# Patient Record
Sex: MALE | Race: WHITE
[De-identification: names, ages, dates, MRNs, and addresses within clinical notes are randomized per-mention and may not be internally consistent; named-entity substitution may affect disease eponyms.]

---

## 2017-03-03 ENCOUNTER — HOSPITAL ENCOUNTER (OUTPATIENT)
Dept: HOSPITAL 62 - LAB | Age: 72
End: 2017-03-03
Attending: RADIOLOGY
Payer: MEDICARE

## 2017-03-03 DIAGNOSIS — R97.20: ICD-10-CM

## 2017-03-03 DIAGNOSIS — C61: Primary | ICD-10-CM

## 2017-03-03 PROCEDURE — 84153 ASSAY OF PSA TOTAL: CPT

## 2017-03-03 PROCEDURE — 36415 COLL VENOUS BLD VENIPUNCTURE: CPT

## 2017-05-23 ENCOUNTER — HOSPITAL ENCOUNTER (OUTPATIENT)
Dept: HOSPITAL 62 - LAB | Age: 72
End: 2017-05-23
Attending: RADIOLOGY
Payer: MEDICARE

## 2017-05-23 DIAGNOSIS — C61: Primary | ICD-10-CM

## 2017-05-23 DIAGNOSIS — R97.20: ICD-10-CM

## 2017-05-23 PROCEDURE — 84153 ASSAY OF PSA TOTAL: CPT

## 2017-05-23 PROCEDURE — 36415 COLL VENOUS BLD VENIPUNCTURE: CPT

## 2017-08-21 ENCOUNTER — HOSPITAL ENCOUNTER (OUTPATIENT)
Dept: HOSPITAL 62 - LAB | Age: 72
End: 2017-08-21
Attending: RADIOLOGY
Payer: MEDICARE

## 2017-08-21 DIAGNOSIS — C61: Primary | ICD-10-CM

## 2017-08-21 DIAGNOSIS — R97.20: ICD-10-CM

## 2017-08-21 PROCEDURE — 36415 COLL VENOUS BLD VENIPUNCTURE: CPT

## 2017-08-21 PROCEDURE — 84153 ASSAY OF PSA TOTAL: CPT

## 2017-11-29 ENCOUNTER — HOSPITAL ENCOUNTER (OUTPATIENT)
Dept: HOSPITAL 62 - LAB | Age: 72
End: 2017-11-29
Attending: RADIOLOGY
Payer: MEDICARE

## 2017-11-29 DIAGNOSIS — C61: Primary | ICD-10-CM

## 2017-11-29 DIAGNOSIS — R97.20: ICD-10-CM

## 2017-11-29 PROCEDURE — 36415 COLL VENOUS BLD VENIPUNCTURE: CPT

## 2017-11-29 PROCEDURE — 84153 ASSAY OF PSA TOTAL: CPT

## 2018-02-26 ENCOUNTER — HOSPITAL ENCOUNTER (OUTPATIENT)
Dept: HOSPITAL 62 - LAB | Age: 73
End: 2018-02-26
Attending: RADIOLOGY
Payer: MEDICARE

## 2018-02-26 DIAGNOSIS — C61: Primary | ICD-10-CM

## 2018-02-26 PROCEDURE — 36415 COLL VENOUS BLD VENIPUNCTURE: CPT

## 2018-02-26 PROCEDURE — 84153 ASSAY OF PSA TOTAL: CPT

## 2018-03-22 ENCOUNTER — HOSPITAL ENCOUNTER (OUTPATIENT)
Dept: HOSPITAL 62 - WI | Age: 73
End: 2018-03-22
Attending: RADIOLOGY
Payer: MEDICARE

## 2018-03-22 DIAGNOSIS — N62: ICD-10-CM

## 2018-03-22 DIAGNOSIS — N64.4: Primary | ICD-10-CM

## 2018-03-22 DIAGNOSIS — C61: ICD-10-CM

## 2018-03-22 PROCEDURE — 77062 BREAST TOMOSYNTHESIS BI: CPT

## 2018-03-22 PROCEDURE — 76642 ULTRASOUND BREAST LIMITED: CPT

## 2018-03-22 PROCEDURE — 77066 DX MAMMO INCL CAD BI: CPT

## 2018-03-22 PROCEDURE — G0279 TOMOSYNTHESIS, MAMMO: HCPCS

## 2018-03-23 NOTE — WOMENS IMAGING REPORT
EXAM DESCRIPTION:  3D DX MAMMO BILAT; U/S BREAST UNILAT LIMITED



COMPLETED DATE/TIME:  3/22/2018 9:27 am; 3/22/2018 10:06 am



REASON FOR STUDY:  MASTODYNIA; LEFT BREAST; N64.4 C61  MALIGNANT NEOPLASM OF PROSTATE N64.4  MASTODYN
IA



COMPARISON:  None.



TECHNIQUE:  Standard craniocaudal and mediolateral oblique views of each breast recorded using digita
l acquisition and breast tomosynthesis.

Additional left male breast 90 mediolateral view, exaggerated craniocaudad view, and cone compressio
n retroareolar region in the CC and MLO orientations.

Left breast ultrasound was performed with comparison right male breast imaging with ultrasound



LIMITATIONS:  None.



FINDINGS:  RIGHT BREAST

MASSES: No suspicious masses.

CALCIFICATIONS: No new or suspicious calcifications.

ARCHITECTURAL DISTORTION: None.

DEVELOPING DENSITY: None.

ASYMMETRY: None noted.

OTHER: Mild gynecomastia

LEFT BREAST

MASSES: No suspicious masses.

CALCIFICATIONS: No new or suspicious calcifications.

ARCHITECTURAL DISTORTION: None.

DEVELOPING DENSITY: None.

ASYMMETRY: None noted.

OTHER: Mild gynecomastia

Read with the assistance of CAD:

.[facility] - R2 Cenova Version 1.3

.[facility] - R2 Cenova Version 1.3

.[facility] - R2 Cenova Version 2.4

.[facility] - R2 Cenova Version 2.4

.[facility] - R2  Version 9.2

Left breast ultrasound:  Ultrasound of the left male breast was performed.  There is mild left retroa
reolar gynecomastia.  Comparison ultrasound of the right male breast demonstrates mild right gynecoma
stia.



IMPRESSION:  Bilateral gynecomastia on mammography/tomosynthesis and ultrasound.  No worrisome featur
es



BREAST DENSITY:  a. The breasts are almost entirely fatty.



BIRAD:  2 Benign findings.



RECOMMENDATION:  RECOMMENDED FOLLOW UP: Clinical followup for bilateral benign-appearing gynecomastia


SPECIFIC INTERVENTION/IMAGING/CONSULTATION RECOMMENDED:No additional intervention/ imaging/consultati
on needed at this time.

COMMUNICATION:Patient notified by letter



COMMENT:  The patient has been notified of the results by letter per MQSA requirements. Additional no
tification policies are in place for contacting patient with suspicious or incomplete findings.

Quality ID #225: The American College of Radiology recommends an annual screening mammogram for women
 aged 40 years or over. This facility utilizes a reminder system to ensure that all patients receive 
reminder letters, and/or direct phone calls for appointments. This includes reminders for routine scr
eening mammograms, diagnostic mammograms, or other Breast Imaging Interventions when appropriate.  Th
is patient will be placed in the appropriate reminder system.

The American College of Radiology (ACR) has developed recommendations for screening MRI of the breast
s in certain patient populations, to be used in conjunction with mammography.  Breast MRI surveillanc
e may be appropriate for women with more than 20% lifetime risk of developing breast cancer  as deter
mined by genetic testing, significant family history of the disease, or history of mantle radiation f
or Hodgkins Disease.  ACR Practice Guidelines 2008.

[employer]



PQRS 6045F:  Fluoroscopic imaging is not utilized for breast tomosynthesis.



TECHNICAL DOCUMENTATION:  FINDING NUMBER: (1)

ASSESSMENT: (1)

JOB ID:  [id number]

 2011 [employer]- All Rights Reserved



Reading location - IP/workstation name: [facility]

## 2018-03-23 NOTE — WOMENS IMAGING REPORT
EXAM DESCRIPTION:  3D DX MAMMO BILAT; U/S BREAST UNILAT LIMITED



COMPLETED DATE/TIME:  3/22/2018 9:27 am; 3/22/2018 10:06 am



REASON FOR STUDY:  MASTODYNIA; LEFT BREAST; N64.4 C61  MALIGNANT NEOPLASM OF PROSTATE N64.4  MASTODYN
IA



COMPARISON:  None.



TECHNIQUE:  Standard craniocaudal and mediolateral oblique views of each breast recorded using digita
l acquisition and breast tomosynthesis.

Additional left male breast 90 mediolateral view, exaggerated craniocaudad view, and cone compressio
n retroareolar region in the CC and MLO orientations.

Left breast ultrasound was performed with comparison right male breast imaging with ultrasound



LIMITATIONS:  None.



FINDINGS:  RIGHT BREAST

MASSES: No suspicious masses.

CALCIFICATIONS: No new or suspicious calcifications.

ARCHITECTURAL DISTORTION: None.

DEVELOPING DENSITY: None.

ASYMMETRY: None noted.

OTHER: Mild gynecomastia

LEFT BREAST

MASSES: No suspicious masses.

CALCIFICATIONS: No new or suspicious calcifications.

ARCHITECTURAL DISTORTION: None.

DEVELOPING DENSITY: None.

ASYMMETRY: None noted.

OTHER: Mild gynecomastia

Read with the assistance of CAD:

.[facility] - R2 Cenova Version 1.3

.[facility] - R2 Cenova Version 1.3

.[facility] - R2 Cenova Version 2.4

.[facility] - R2 Cenova Version 2.4

.[facility] - R2  Version 9.2

Left breast ultrasound:  Ultrasound of the left male breast was performed.  There is mild left retroa
reolar gynecomastia.  Comparison ultrasound of the right male breast demonstrates mild right gynecoma
stia.



IMPRESSION:  Bilateral gynecomastia on mammography/tomosynthesis and ultrasound.  No worrisome featur
es



BREAST DENSITY:  a. The breasts are almost entirely fatty.



BIRAD:  2 Benign findings.



RECOMMENDATION:  RECOMMENDED FOLLOW UP: Clinical followup for bilateral benign-appearing gynecomastia


SPECIFIC INTERVENTION/IMAGING/CONSULTATION RECOMMENDED:No additional intervention/ imaging/consultati
on needed at this time.

COMMUNICATION:Patient notified by letter



COMMENT:  The patient has been notified of the results by letter per MQSA requirements. Additional no
tification policies are in place for contacting patient with suspicious or incomplete findings.

Quality ID #225: The American College of Radiology recommends an annual screening mammogram for women
 aged 40 years or over. This facility utilizes a reminder system to ensure that all patients receive 
reminder letters, and/or direct phone calls for appointments. This includes reminders for routine scr
eening mammograms, diagnostic mammograms, or other Breast Imaging Interventions when appropriate.  Th
is patient will be placed in the appropriate reminder system.

The American College of Radiology (ACR) has developed recommendations for screening MRI of the breast
s in certain patient populations, to be used in conjunction with mammography.  Breast MRI surveillanc
e may be appropriate for women with more than 20% lifetime risk of developing breast cancer  as deter
mined by genetic testing, significant family history of the disease, or history of mantle radiation f
or Hodgkins Disease.  ACR Practice Guidelines 2008.

[employer]



PQRS 6045F:  Fluoroscopic imaging is not utilized for breast tomosynthesis.



TECHNICAL DOCUMENTATION:  FINDING NUMBER: (1)

ASSESSMENT: (1)

JOB ID:  [id number]

 2011 [employer]- All Rights Reserved



Reading location - IP/workstation name: [facility]

## 2018-07-03 ENCOUNTER — HOSPITAL ENCOUNTER (OUTPATIENT)
Dept: HOSPITAL 62 - LAB | Age: 73
End: 2018-07-03
Attending: RADIOLOGY
Payer: MEDICARE

## 2018-07-03 DIAGNOSIS — C61: Primary | ICD-10-CM

## 2018-07-03 DIAGNOSIS — R97.20: ICD-10-CM

## 2018-07-03 PROCEDURE — 84153 ASSAY OF PSA TOTAL: CPT

## 2018-07-03 PROCEDURE — 36415 COLL VENOUS BLD VENIPUNCTURE: CPT

## 2018-08-14 ENCOUNTER — HOSPITAL ENCOUNTER (INPATIENT)
Dept: HOSPITAL 62 - ER | Age: 73
LOS: 2 days | Discharge: HOME | DRG: 392 | End: 2018-08-16
Attending: INTERNAL MEDICINE | Admitting: INTERNAL MEDICINE
Payer: MEDICARE

## 2018-08-14 DIAGNOSIS — R11.2: ICD-10-CM

## 2018-08-14 DIAGNOSIS — Z85.46: ICD-10-CM

## 2018-08-14 DIAGNOSIS — A05.9: Primary | ICD-10-CM

## 2018-08-14 DIAGNOSIS — E86.0: ICD-10-CM

## 2018-08-14 LAB
ABSOLUTE LYMPHOCYTES# (MANUAL): 0.3 10^3/UL (ref 0.5–4.7)
ABSOLUTE MONOCYTES # (MANUAL): 0.6 10^3/UL (ref 0.1–1.4)
ABSOLUTE NEUTROPHILS# (MANUAL): 8.7 10^3/UL (ref 1.7–8.2)
ADD MANUAL DIFF: YES
ALBUMIN SERPL-MCNC: 3.6 G/DL (ref 3.5–5)
ALP SERPL-CCNC: 55 U/L (ref 38–126)
ALT SERPL-CCNC: 19 U/L (ref 21–72)
ANION GAP SERPL CALC-SCNC: 13 MMOL/L (ref 5–19)
APPEARANCE UR: (no result)
APTT PPP: YELLOW S
AST SERPL-CCNC: 18 U/L (ref 17–59)
BASE EXCESS BLDV CALC-SCNC: 0 MMOL/L
BASOPHILS NFR BLD MANUAL: 0 % (ref 0–2)
BILIRUB DIRECT SERPL-MCNC: 0.2 MG/DL (ref 0–0.4)
BILIRUB SERPL-MCNC: 0.7 MG/DL (ref 0.2–1.3)
BILIRUB UR QL STRIP: NEGATIVE
BUN SERPL-MCNC: 23 MG/DL (ref 7–20)
CALCIUM: 8.4 MG/DL (ref 8.4–10.2)
CHLORIDE SERPL-SCNC: 102 MMOL/L (ref 98–107)
CO2 SERPL-SCNC: 22 MMOL/L (ref 22–30)
EOSINOPHIL NFR BLD MANUAL: 0 % (ref 0–6)
ERYTHROCYTE [DISTWIDTH] IN BLOOD BY AUTOMATED COUNT: 13.5 % (ref 11.5–14)
GLUCOSE SERPL-MCNC: 121 MG/DL (ref 75–110)
GLUCOSE UR STRIP-MCNC: NEGATIVE MG/DL
HCO3 BLDV-SCNC: 23.4 MMOL/L (ref 20–32)
HCT VFR BLD CALC: 36.7 % (ref 37.9–51)
HGB BLD-MCNC: 12.9 G/DL (ref 13.5–17)
INR PPP: 1.12
KETONES UR STRIP-MCNC: NEGATIVE MG/DL
MCH RBC QN AUTO: 32.8 PG (ref 27–33.4)
MCHC RBC AUTO-ENTMCNC: 35.2 G/DL (ref 32–36)
MCV RBC AUTO: 93 FL (ref 80–97)
MONOCYTES % (MANUAL): 6 % (ref 3–13)
NEUTS BAND NFR BLD MANUAL: 1 % (ref 3–5)
NITRITE UR QL STRIP: NEGATIVE
PCO2 BLDV: 35.1 MMHG (ref 35–63)
PH BLDV: 7.44 [PH] (ref 7.3–7.42)
PH UR STRIP: 5 [PH] (ref 5–9)
PLATELET # BLD: 115 10^3/UL (ref 150–450)
PLATELET COMMENT: (no result)
POTASSIUM SERPL-SCNC: 3.4 MMOL/L (ref 3.6–5)
PROT SERPL-MCNC: 6.7 G/DL (ref 6.3–8.2)
PROT UR STRIP-MCNC: 30 MG/DL
PROTHROMBIN TIME: 15 SEC (ref 11.4–15.4)
RBC # BLD AUTO: 3.93 10^6/UL (ref 4.35–5.55)
SEGMENTED NEUTROPHILS % (MAN): 90 % (ref 42–78)
SODIUM SERPL-SCNC: 137 MMOL/L (ref 137–145)
SP GR UR STRIP: 1.03
TOTAL CELLS COUNTED BLD: 100
UROBILINOGEN UR-MCNC: NEGATIVE MG/DL (ref ?–2)
VARIANT LYMPHS NFR BLD MANUAL: 3 % (ref 13–45)
WBC # BLD AUTO: 9.6 10^3/UL (ref 4–10.5)

## 2018-08-14 PROCEDURE — 96361 HYDRATE IV INFUSION ADD-ON: CPT

## 2018-08-14 PROCEDURE — 36415 COLL VENOUS BLD VENIPUNCTURE: CPT

## 2018-08-14 PROCEDURE — 83605 ASSAY OF LACTIC ACID: CPT

## 2018-08-14 PROCEDURE — 87205 SMEAR GRAM STAIN: CPT

## 2018-08-14 PROCEDURE — 85025 COMPLETE CBC W/AUTO DIFF WBC: CPT

## 2018-08-14 PROCEDURE — 85027 COMPLETE CBC AUTOMATED: CPT

## 2018-08-14 PROCEDURE — 80069 RENAL FUNCTION PANEL: CPT

## 2018-08-14 PROCEDURE — G0378 HOSPITAL OBSERVATION PER HR: HCPCS

## 2018-08-14 PROCEDURE — 99285 EMERGENCY DEPT VISIT HI MDM: CPT

## 2018-08-14 PROCEDURE — 74176 CT ABD & PELVIS W/O CONTRAST: CPT

## 2018-08-14 PROCEDURE — 87324 CLOSTRIDIUM AG IA: CPT

## 2018-08-14 PROCEDURE — 87493 C DIFF AMPLIFIED PROBE: CPT

## 2018-08-14 PROCEDURE — 82803 BLOOD GASES ANY COMBINATION: CPT

## 2018-08-14 PROCEDURE — 96374 THER/PROPH/DIAG INJ IV PUSH: CPT

## 2018-08-14 PROCEDURE — 85610 PROTHROMBIN TIME: CPT

## 2018-08-14 PROCEDURE — 93010 ELECTROCARDIOGRAM REPORT: CPT

## 2018-08-14 PROCEDURE — 80048 BASIC METABOLIC PNL TOTAL CA: CPT

## 2018-08-14 PROCEDURE — 87040 BLOOD CULTURE FOR BACTERIA: CPT

## 2018-08-14 PROCEDURE — 71046 X-RAY EXAM CHEST 2 VIEWS: CPT

## 2018-08-14 PROCEDURE — 87086 URINE CULTURE/COLONY COUNT: CPT

## 2018-08-14 PROCEDURE — 87045 FECES CULTURE AEROBIC BACT: CPT

## 2018-08-14 PROCEDURE — 93005 ELECTROCARDIOGRAM TRACING: CPT

## 2018-08-14 PROCEDURE — 81001 URINALYSIS AUTO W/SCOPE: CPT

## 2018-08-14 PROCEDURE — 80053 COMPREHEN METABOLIC PANEL: CPT

## 2018-08-14 NOTE — RADIOLOGY REPORT (SQ)
EXAM DESCRIPTION:  CHEST 2 VIEWS



COMPLETED DATE/TIME:  8/14/2018 3:12 pm



REASON FOR STUDY:  FEVER



COMPARISON:  None.



EXAM PARAMETERS:  NUMBER OF VIEWS: two views

TECHNIQUE: Digital Frontal and Lateral radiographic views of the chest acquired.

RADIATION DOSE: NA

LIMITATIONS: none



FINDINGS:  LUNGS AND PLEURA: No opacities, masses or pneumothorax. No pleural effusion.

MEDIASTINUM AND HILAR STRUCTURES: No masses or contour abnormalities.

HEART AND VASCULAR STRUCTURES: Heart normal size.  No evidence for failure.

BONES: No acute findings.

HARDWARE: None in the chest.

OTHER: No other significant finding.



IMPRESSION:  NO ACUTE RADIOGRAPHIC FINDING IN THE CHEST.



TECHNICAL DOCUMENTATION:  JOB ID:  [id number]

 2011 [employer]- All Rights Reserved



Reading location - IP/workstation name: [email or web address]

## 2018-08-14 NOTE — EKG REPORT
SEVERITY:- ABNORMAL ECG -

SINUS RHYTHM

NONSPECIFIC T ABNORMALITIES, INFERIOR LEADS

:

Confirmed by: Ryan De Anda MD 14-Aug-2018 18:42:50

## 2018-08-14 NOTE — RADIOLOGY REPORT (SQ)
EXAM DESCRIPTION:

CT ABDOMEN PELVIS WITHOUT IV CONTRAST



COMPLETED DATE/TME:  08/14/2018 23:17



CLINICAL HISTORY:

73 years Male, fever, diarrhea



Comparison: None.



Technique:  No contrast.  Coronal and sagittal reformat.  This

exam was performed according to our departmental

dose-optimization program, which includes automated exposure

control, adjustment of the mA and/or kV according to patient size

and/or use of iterative reconstruction technique.[facility]: Dose Right

[facility]: CareDose   [facility]: Dose Right    [facility]: Teradose 4D    [facility]:

[employer]



LIMITATIONS:

None



Findings: 



Mild-to-moderate diffuse bowel wall thickening of the right,

transverse, and left colon. There is mild inflammation of

surrounding fat.

Moderate vacuum disc desiccation between the L3 and S1 levels,

0.5 cm L5 retrolisthesis, atherosclerosis, cholelithiasis

including a 1.0 cm calcified rounded stone at the gallbladder

neck., mild bilateral perinephric fat stranding, likely bilateral

parapelvic cysts measure up to 2.8 cm on the left, no evidence of

appendicitis, appendix not discerned. Right upper scrotal clip.

Unenhanced lower thorax, abdominopelvic structures, and

musculoskeleton appear otherwise grossly unremarkable.



Impression: 

1. Moderate colitis. Differential etiologies include infectious,

inflammatory, and neoplastic processes.

2. Cholelithiasis including a 1.0 cm stone at the gallbladder

neck.

## 2018-08-14 NOTE — ER DOCUMENT REPORT
ED Medical Screen (RME)





- General


Chief Complaint: Nausea/Vomiting/Diarrhea


Stated Complaint: NAUSEA, DIARRHEA


Time Seen by Provider: 08/14/18 14:38


TRAVEL OUTSIDE OF THE [place] IN LAST 30 DAYS: No





- HPI


Notes: 





08/14/18 14:44


Patient ate mashed potatoes and other foods approximate 48 hours ago that did 

not taste well now having nausea vomiting diarrhea patient with a fever in 

triage slightly tachycardic with a low blood pressure otherwise patient alert 

and oriented 3 shows no signs of obvious distress complains of no pain





- Related Data


Allergies/Adverse Reactions: 


 





No Known Allergies Allergy (Verified 08/14/18 14:31)


 











Past Medical History





- Social History


Chew tobacco use (# tins/day): No


Frequency of alcohol use: None


Drug Abuse: None


Renal/ Medical History: Denies: Hx Peritoneal Dialysis





Review of Systems





- Review of Systems


Constitutional: Fever - Nausea vomiting diarrhea





Physical Exam





- Vital signs


Vitals: 





 











Temp Pulse Resp BP Pulse Ox


 


 101.2 F H  107 H  16   99/55 L  95 


 


 08/14/18 14:22  08/14/18 14:22  08/14/18 14:22  08/14/18 14:22  08/14/18 14:22














- Respiratory


Respiratory status: No respiratory distress


Chest status: Nontender


Breath sounds: Normal


Chest palpation: Normal





- Abdominal


Inspection: Normal


Distension: No distension


Bowel sounds: Normal


Tenderness: Nontender





Course





- Vital Signs


Vital signs: 





 











Temp Pulse Resp BP Pulse Ox


 


 101.2 F H  107 H  16   99/55 L  95 


 


 08/14/18 14:22  08/14/18 14:22  08/14/18 14:22  08/14/18 14:22  08/14/18 14:22














Doctor's Discharge





- Discharge


Referrals: 


AZEEM CARRERA DO [Primary Care Provider] - Follow up as needed

## 2018-08-14 NOTE — ER DOCUMENT REPORT
ED General





- General


Mode of Arrival: Ambulatory


Information source: Patient


TRAVEL OUTSIDE OF THE [place] IN LAST 30 DAYS: No





<ELIECER JENKINS - Last Filed: 08/14/18 17:24>





<LIZETH WILLS - Last Filed: 08/14/18 20:44>





<JACLYN KERN - Last Filed: 08/14/18 23:18>





<MARCO ANTONIO CLINTON - Last Filed: 08/15/18 03:29>





- General


Chief Complaint: Nausea/Vomiting/Diarrhea


Stated Complaint: NAUSEA, DIARRHEA


Time Seen by Provider: 08/14/18 14:38


Notes: 





73-year-old male that presents to the emergency department today with 

complaints of chills, fevers, and vomiting.  Patient states he vomited his 

dinner up last night, ate breakfast today without any abnormalities, and then 

vomited again today when eating lunch around 1230.  Patient states he takes no 

medicine, his only past medical history is prostate cancer which he was 

radiated with radium implants and external beam radiation which cured the 

malignancy to his knowledge.  Patient has had associated diarrhea and chills. (

ELIECER JENKINS)





- Related Data


Allergies/Adverse Reactions: 


 





No Known Allergies Allergy (Verified 08/14/18 14:31)


 











Past Medical History





- General


Information source: Patient





- Social History


Smoking Status: Never Smoker


Cigarette use (# per day): No


Chew tobacco use (# tins/day): No


Frequency of alcohol use: None


Drug Abuse: None


Lives with: Family


Family History: Reviewed & Not Pertinent


Patient has suicidal ideation: No


Patient has homicidal ideation: No





- Medical History


Medical History: Negative


Surgical Hx: Negative





<ELIECER JENKINS - Last Filed: 08/14/18 17:24>





Review of Systems





- Review of Systems


Constitutional: See HPI, Chills, Fever


EENT: No symptoms reported


Cardiovascular: No symptoms reported


Respiratory: No symptoms reported


Gastrointestinal: See HPI, Diarrhea, Vomiting


Genitourinary: No symptoms reported


Male Genitourinary: No symptoms reported


Musculoskeletal: No symptoms reported


Skin: No symptoms reported


Hematologic/Lymphatic: No symptoms reported


Neurological/Psychological: No symptoms reported


-: Yes All other systems reviewed and negative





<ELIECER JENKINS - Last Filed: 08/14/18 17:24>





Physical Exam





<ELIECER JENKINS - Last Filed: 08/14/18 17:24>





<LIZETH WILLS - Last Filed: 08/14/18 20:44>





<JACLYN KERN - Last Filed: 08/14/18 23:18>





<MARCO ANTONIO CLINTON - Last Filed: 08/15/18 03:29>





- Vital signs


Vitals: 


 











Temp Pulse Resp BP Pulse Ox


 


 101.2 F H  107 H  16   99/55 L  95 


 


 08/14/18 14:22  08/14/18 14:22  08/14/18 14:22  08/14/18 14:22  08/14/18 14:22














- Notes


Notes: 





Physical Exam:


 


General: Alert, appears well. 


 


HEENT: Normocephalic. Atraumatic. PERRL. Extraocular movements intact. 

Oropharynx clear.


 


Neck: Supple. Non-tender.


 


Respiratory: No respiratory distress. Clear and equal breath sounds bilaterally.


 


Cardiovascular: Regular rate and rhythm. 


 


Abdominal: Normal Inspection. Non-tender. No distension. Normal Bowel Sounds. 


 


Back: Non-tender. No deformity or step off.


 


Extremities: Moves all four extremities.


Upper extremities: Normal inspection. Normal ROM.  


Lower extremities: Normal inspection. No edema. Normal ROM.


 


Neurological: Normal cognition. AAOx4. Normal speech.  


 


Psychological: Normal affect. Normal Mood. 


 


Skin: Very warm to the touch. Dry. Normal color. (ELIECER JENKINS)





Course





- Laboratory


Result Diagrams: 


 08/14/18 15:00





 08/14/18 15:00





<ELIECER JENKINS - Last Filed: 08/14/18 17:24>





- Laboratory


Result Diagrams: 


 08/14/18 15:00





 08/14/18 15:00





- EKG Interpretation by Me


EKG shows normal: Sinus rhythm, Axis, Intervals, QRS Complexes.  abnormal: ST-T 

Waves - Nonspecific inferior T abnormalities


Rate: Normal


Rhythm: NSR





- Transfer of Care


Care transferred to following provider: Dr. Clinton





<LIZETH WILLS - Last Filed: 08/14/18 20:44>





- Laboratory


Result Diagrams: 


 08/14/18 15:00





 08/14/18 15:00





<JACLYN KERN - Last Filed: 08/14/18 23:18>





- Laboratory


Result Diagrams: 


 08/14/18 15:00





 08/14/18 15:00





<MARCO ANTONIO CLINTON - Last Filed: 08/15/18 03:29>





- Re-evaluation


Re-evalutation: 





08/14/18 23:13


Rechecked Pt. He denies feeling any better. Informed pt that his labs and 

urines looked well enough to discharge home but pt does not feel comfortable 

with going home tonight. Will repeat temperature and order a CT. 


08/14/18 23:18


 (JACLYN KERN)








08/15/18 00:28


Patient CT is consistent with colitis.  Patient's fever on recheck was 102.2.  

He is given Tylenol is feeling better.  Given the patient's tachycardia, 

hypotension on presentation, and continued symptoms, he will be referred to the 

hospitalist service for admission.  Patient is very uncomfortable going home as 

he is still really not feeling very good  Started on Cipro Flagyl for colitis.  

Stable at time of admission.


 (MARCO ANTONIO CLINTON)





- Vital Signs


Vital signs: 


 











Temp Pulse Resp BP Pulse Ox


 


 102.2 F H  107 H  23 H  118/67   96 


 


 08/14/18 23:21  08/14/18 14:22  08/14/18 21:01  08/14/18 21:01  08/14/18 21:01














- Laboratory


Laboratory results interpreted by me: 


 











  08/14/18 08/14/18 08/14/18





  15:00 15:00 15:00


 


RBC  3.93 L  


 


Hgb  12.9 L  


 


Hct  36.7 L  


 


Plt Count  115 L  


 


Seg Neuts % (Manual)  90 H  


 


Band Neutrophils %  1 L  


 


Lymphocytes % (Manual)  3 L  


 


Abs Neuts (Manual)  8.7 H  


 


Abs Lymphs (Manual)  0.3 L  


 


VBG pH    7.44 H


 


Potassium   3.4 L 


 


BUN   23 H 


 


Creatinine   1.28 H 


 


Est GFR (Non-Af Amer)   55 L 


 


Glucose   121 H 


 


ALT   19 L 


 


Urine Protein   














  08/14/18





  20:51


 


RBC 


 


Hgb 


 


Hct 


 


Plt Count 


 


Seg Neuts % (Manual) 


 


Band Neutrophils % 


 


Lymphocytes % (Manual) 


 


Abs Neuts (Manual) 


 


Abs Lymphs (Manual) 


 


VBG pH 


 


Potassium 


 


BUN 


 


Creatinine 


 


Est GFR (Non-Af Amer) 


 


Glucose 


 


ALT 


 


Urine Protein  30 H














- Transfer of Care


Notes: 





08/14/18 20:27


Pending urinalysis and stool for WBCs.  Patient will continue receiving IV 

hydration. (LIZETH WILLS)





Discharge





<ELIECER JENKINS - Last Filed: 08/14/18 17:24>





<LIZETH WILLS - Last Filed: 08/14/18 20:44>





<JACLYN KERN - Last Filed: 08/14/18 23:18>





- Discharge


Admitting Provider: Hospitalist - Anibal


Unit Admitted: Medical Floor





<MARCO ANTONIO CLINTON - Last Filed: 08/15/18 03:29>





- Discharge


Clinical Impression: 


 Nausea, vomiting and diarrhea, Dehydration, Colitis





Fever


Qualifiers:


 Fever type: unspecified Qualified Code(s): R50.9 - Fever, unspecified





Condition: Stable


Disposition: ADMITTED AS INPATIENT


Scribe Attestation: 





08/14/18 20:27


I personally performed the services described in the documentation, reviewed 

and edited the documentation which was dictated to the scribe in my presence, 

and it accurately records my words and actions. (LIZETH WILLS)





08/15/18 03:29


I personally performed the services described in the documentation, reviewed 

and edited the documentation which was dictated to the scribe in my presence, 

and it accurately records my words and actions. (MARCO ANTONIO CLINTON)





Scribe Documentation





- Scribe


Written by Scrdenisee:: Damion Ibrahim, 8/14/2018 1734


acting as scribe for :: Osmin





<ELIECER JENKINS - Last Filed: 08/14/18 17:24>

## 2018-08-15 LAB
ADD MANUAL DIFF: NO
ANION GAP SERPL CALC-SCNC: 9 MMOL/L (ref 5–19)
BASOPHILS # BLD AUTO: 0 10^3/UL (ref 0–0.2)
BASOPHILS NFR BLD AUTO: 0.1 % (ref 0–2)
BUN SERPL-MCNC: 15 MG/DL (ref 7–20)
CALCIUM: 7.9 MG/DL (ref 8.4–10.2)
CHLORIDE SERPL-SCNC: 107 MMOL/L (ref 98–107)
CO2 SERPL-SCNC: 23 MMOL/L (ref 22–30)
EOSINOPHIL # BLD AUTO: 0 10^3/UL (ref 0–0.6)
EOSINOPHIL NFR BLD AUTO: 0 % (ref 0–6)
ERYTHROCYTE [DISTWIDTH] IN BLOOD BY AUTOMATED COUNT: 13.8 % (ref 11.5–14)
GLUCOSE SERPL-MCNC: 101 MG/DL (ref 75–110)
HCT VFR BLD CALC: 34.3 % (ref 37.9–51)
HGB BLD-MCNC: 12.2 G/DL (ref 13.5–17)
LYMPHOCYTES # BLD AUTO: 0.5 10^3/UL (ref 0.5–4.7)
LYMPHOCYTES NFR BLD AUTO: 8.7 % (ref 13–45)
MCH RBC QN AUTO: 33.5 PG (ref 27–33.4)
MCHC RBC AUTO-ENTMCNC: 35.5 G/DL (ref 32–36)
MCV RBC AUTO: 94 FL (ref 80–97)
MONOCYTES # BLD AUTO: 0.5 10^3/UL (ref 0.1–1.4)
MONOCYTES NFR BLD AUTO: 8.4 % (ref 3–13)
NEUTROPHILS # BLD AUTO: 4.8 10^3/UL (ref 1.7–8.2)
NEUTS SEG NFR BLD AUTO: 82.8 % (ref 42–78)
PLATELET # BLD: 89 10^3/UL (ref 150–450)
POTASSIUM SERPL-SCNC: 3.6 MMOL/L (ref 3.6–5)
RBC # BLD AUTO: 3.63 10^6/UL (ref 4.35–5.55)
SODIUM SERPL-SCNC: 139 MMOL/L (ref 137–145)
TOTAL CELLS COUNTED % (AUTO): 100 %
WBC # BLD AUTO: 5.8 10^3/UL (ref 4–10.5)

## 2018-08-15 RX ADMIN — SODIUM CHLORIDE PRN MLS/HR: 9 INJECTION, SOLUTION INTRAVENOUS at 01:07

## 2018-08-15 RX ADMIN — METRONIDAZOLE SCH MG: 500 TABLET ORAL at 18:20

## 2018-08-15 RX ADMIN — METRONIDAZOLE SCH MG: 500 TABLET ORAL at 06:07

## 2018-08-15 RX ADMIN — SODIUM CHLORIDE PRN MLS/HR: 9 INJECTION, SOLUTION INTRAVENOUS at 12:06

## 2018-08-15 RX ADMIN — HEPARIN SODIUM SCH: 5000 INJECTION, SOLUTION INTRAVENOUS; SUBCUTANEOUS at 21:40

## 2018-08-15 RX ADMIN — CIPROFLOXACIN SCH MLS/HR: 2 INJECTION INTRAVENOUS at 21:47

## 2018-08-15 RX ADMIN — CIPROFLOXACIN SCH MLS/HR: 2 INJECTION INTRAVENOUS at 10:16

## 2018-08-15 RX ADMIN — HEPARIN SODIUM SCH: 5000 INJECTION, SOLUTION INTRAVENOUS; SUBCUTANEOUS at 06:07

## 2018-08-15 RX ADMIN — METRONIDAZOLE SCH MG: 500 TABLET ORAL at 12:06

## 2018-08-15 RX ADMIN — METRONIDAZOLE SCH MG: 500 TABLET ORAL at 23:08

## 2018-08-15 RX ADMIN — HEPARIN SODIUM SCH: 5000 INJECTION, SOLUTION INTRAVENOUS; SUBCUTANEOUS at 13:36

## 2018-08-15 NOTE — PDOC PROGRESS REPORT
Subjective


Progress Note for:: 08/15/18


Subjective:: 





73-year-old male with a past medical history significant for prostate cancer, 

presenting with abdominal pain nausea vomiting and diarrhea.  He was admitted 

with a fever of 102.  He does describe eating at 2 restaurants recently in the 1

-2 days prior to becoming ill.  States that he first became sick to his stomach 

on Monday.  States that he had some questionable meatloaf at a diner prior to 

this.  Patient is in pleasant mood today, and describes improvement in his 

abdominal discomfort.  He does describe improvement in his appetite today.  We 

will continue to advance his diet.  C. difficile testing came back negative.  

Stool culture pending at present.  He is continued on IV Flagyl and IV Cipro at 

present.  CT scan was consistent with a diffuse colitis, possible infectious 

etiology.


Reason For Visit: 


NAUSEA VOMITING COLITIS








Physical Exam


Vital Signs: 


 











Temp Pulse Resp BP Pulse Ox


 


 97.9 F   67   16   110/61   98 


 


 08/15/18 10:42  08/15/18 10:42  08/15/18 10:42  08/15/18 10:42  08/15/18 10:42








 Intake & Output











 08/14/18 08/15/18 08/16/18





 06:59 06:59 06:59


 


Intake Total  800 1641


 


Balance  800 1641


 


Weight   92.079 kg











General appearance: PRESENT: no acute distress, obese.  ABSENT: mild distress


Head exam: PRESENT: atraumatic, normocephalic


Eye exam: PRESENT: EOMI.  ABSENT: conjunctival injection, nystagmus


Ear exam: PRESENT: normal external ear exam.  ABSENT: bleeding


Mouth exam: PRESENT: moist, neck supple.  ABSENT: dry mucosa


Throat exam: ABSENT: post pharyngeal erythema, tonsillar exudate


Neck exam: ABSENT: carotid bruit, tenderness, thyromegaly


Respiratory exam: ABSENT: accessory muscle use, rales, rhonchi, unlabored, 

wheezes


Cardiovascular exam: PRESENT: RRR, +S1, +S2


Pulses: PRESENT: normal carotid pulses, normal dorsalis pedis pul


GI/Abdominal exam: ABSENT: ascites, mass, rigid, tenderness


Extremities exam: ABSENT: calf tenderness, joint swelling


Musculoskeletal exam: PRESENT: ambulatory, full ROM


Neurological exam: PRESENT: alert, awake, oriented to person, oriented to place

, oriented to time, oriented to situation, CN II-XII grossly intact


Psychiatric exam: ABSENT: agitated, flat affect


Focused psych exam: ABSENT: catatonic, paranoid


Skin exam: ABSENT: abrasion, mottled





Results


Laboratory Results: 


 





 08/15/18 06:59 





 08/15/18 06:59 





 











  08/15/18 08/15/18





  06:59 06:59


 


WBC  5.8 


 


RBC  3.63 L 


 


Hgb  12.2 L 


 


Hct  34.3 L 


 


MCV  94 


 


MCH  33.5 H 


 


MCHC  35.5 


 


RDW  13.8 


 


Plt Count  89 L 


 


Seg Neutrophils %  82.8 H 


 


Lymphocytes %  8.7 L 


 


Monocytes %  8.4 


 


Eosinophils %  0.0 


 


Basophils %  0.1 


 


Absolute Neutrophils  4.8 


 


Absolute Lymphocytes  0.5 


 


Absolute Monocytes  0.5 


 


Absolute Eosinophils  0.0 


 


Absolute Basophils  0.0 


 


Sodium   139.0


 


Potassium   3.6


 


Chloride   107


 


Carbon Dioxide   23


 


Anion Gap   9


 


BUN   15


 


Creatinine   0.97


 


Est GFR ( Amer)   > 60


 


Est GFR (Non-Af Amer)   > 60


 


Glucose   101


 


Calcium   7.9 L











Impressions: 


 





Chest X-Ray  08/14/18 14:39


IMPRESSION:  NO ACUTE RADIOGRAPHIC FINDING IN THE CHEST.


 














Assessment & Plan





- Diagnosis


(1) Colitis


Is this a current diagnosis for this admission?: Yes   


Plan: 


Back this is secondary to recent food poisoning.


CT scan consistent with diffuse colitis picture.  Likely infectious etiology.


Continue empiric IV Flagyl and Cipro at present.


Continue supportive IV fluids.








(2) Dehydration


Is this a current diagnosis for this admission?: Yes   


Plan: 


Continue supportive IV fluids at present.








(3) Fever


Qualifiers: 


   Fever type: unspecified   Qualified Code(s): R50.9 - Fever, unspecified   


Is this a current diagnosis for this admission?: Yes   


Plan: 


Fever improving with empiric antibiotics and tylenol








(4) Nausea, vomiting and diarrhea


Is this a current diagnosis for this admission?: Yes   


Plan: 


prn anti emetics as needed. 








- Inpatient Certification


Based on my medical assessment, after consideration of the patient's 

comorbidities, presenting symptoms, or acuity I expect that the services needed 

warrant INPATIENT care.: Yes


I certify that my determination is in accordance with my understanding of 

Medicare's requirements for reasonable and necessary INPATIENT services [42 CFR 

412.3e].: Yes


Medical Necessity: Need Close Monitoring Due to Risk of Patient Decompensation, 

Need For IV Fluids

## 2018-08-15 NOTE — PDOC H&P
History of Present Illness


Admission Date/PCP: 


  08/15/18 00:16





  AZEEM HUNG [facility]





Patient complains of: Nausea vomiting and abdominal pain


History of Present Illness: 


VANE AGUILERA is a 73 year old male with past medical history of remote 

prostate cancer with benign follow-up presents with 24 hours of abdominal pain 

nausea vomiting and diarrhea.  He admits fever of 102.  Denies suspect meals, 

recent travel, infectious contacts and otherwise feels well.  In the emergency 

room he is found to have leukocytosis and a CT abdomen pelvis remarkable for 

diffuse colitis.  He started on IV fluid, symptomatic management, empiric 

antibiotics and referred to the hospitalist for admission.  He denies previous 

episode or new medications.





Social History


Information Source: Patient


Lives with: Family


Smoking Status: Never Smoker


Frequency of Alcohol Use: None


Drugs: None





- Advance Directive


Resuscitation Status: Full Code





Family History


Family History: Arthritis


Parental Family History Reviewed: Yes


Children Family History Reviewed: Yes


Sibling(s) Family History Reviewed.: Yes





Medication/Allergy


Home Medications: 








No Home Medications  08/14/18 








Allergies/Adverse Reactions: 


 





No Known Allergies Allergy (Verified 08/14/18 14:31)


 











Review of Systems


Constitutional: ABSENT: chills, fever(s), headache(s), weight gain, weight loss


Eyes: ABSENT: visual disturbances


Ears: ABSENT: hearing changes


Cardiovascular: ABSENT: chest pain, dyspnea on exertion, edema, orthropnea, 

palpitations


Respiratory: ABSENT: cough, hemoptysis


Gastrointestinal: ABSENT: abdominal pain, constipation, diarrhea, hematemesis, 

hematochezia, nausea, vomiting


Genitourinary: ABSENT: dysuria, hematuria


Musculoskeletal: ABSENT: joint swelling


Integumentary: ABSENT: rash, wounds


Neurological: ABSENT: abnormal gait, abnormal speech, confusion, dizziness, 

focal weakness, syncope


Psychiatric: ABSENT: anxiety, depression, homidical ideation, suicidal ideation


Endocrine: ABSENT: cold intolerance, heat intolerance, polydipsia, polyuria


Hematologic/Lymphatic: ABSENT: easy bleeding, easy bruising





Physical Exam


Vital Signs: 


 











Temp Pulse Resp BP Pulse Ox


 


 102.2 F H  107 H  18   112/63   100 


 


 08/14/18 23:21  08/14/18 14:22  08/15/18 05:01  08/15/18 05:01  08/15/18 05:01








 Intake & Output











 08/13/18 08/14/18 08/15/18





 11:59 11:59 11:59


 


Intake Total   800


 


Balance   800











General appearance: PRESENT: no acute distress, well-developed, well-nourished


Head exam: PRESENT: atraumatic, normocephalic


Eye exam: PRESENT: conjunctiva pink, EOMI, PERRLA.  ABSENT: scleral icterus


Ear exam: PRESENT: normal external ear exam


Mouth exam: PRESENT: moist, tongue midline


Neck exam: ABSENT: carotid bruit, JVD, lymphadenopathy, thyromegaly


Respiratory exam: PRESENT: clear to auscultation edna.  ABSENT: rales, rhonchi, 

wheezes


Cardiovascular exam: PRESENT: RRR.  ABSENT: diastolic murmur, rubs, systolic 

murmur


Pulses: PRESENT: normal dorsalis pedis pul


Vascular exam: PRESENT: normal capillary refill


GI/Abdominal exam: PRESENT: hyperactive bowel sounds, normal bowel sounds, soft

, tenderness.  ABSENT: distended, guarding, mass, organolmegaly, rebound


Rectal exam: PRESENT: deferred


Extremities exam: PRESENT: full ROM.  ABSENT: calf tenderness, clubbing, pedal 

edema


Neurological exam: PRESENT: alert, awake, oriented to person, oriented to place

, oriented to time, oriented to situation, CN II-XII grossly intact.  ABSENT: 

motor sensory deficit


Psychiatric exam: PRESENT: appropriate affect, normal mood.  ABSENT: homicidal 

ideation, suicidal ideation


Skin exam: PRESENT: dry, intact, warm.  ABSENT: cyanosis, rash





Results


Impressions: 


 





Chest X-Ray  08/14/18 14:39


IMPRESSION:  NO ACUTE RADIOGRAPHIC FINDING IN THE CHEST.


 














Assessment & Plan





- Diagnosis


(1) Colitis


Is this a current diagnosis for this admission?: Yes   


Plan: 


Unclear cause possibly viral, bowel rest, IV fluids, Flagyl and Cipro.  Follow-

up stool studies and CBC








(2) Dehydration


Is this a current diagnosis for this admission?: Yes   


Plan: 


IV fluid challenge, orthostatic blood pressures








(3) Fever


Qualifiers: 


   Fever type: unspecified   Qualified Code(s): R50.9 - Fever, unspecified   


Is this a current diagnosis for this admission?: Yes   


Plan: 


Secondary to #1








(4) Nausea, vomiting and diarrhea


Is this a current diagnosis for this admission?: Yes   


Plan: 


Secondary to #1








- Time


Time Spent: 30 to 50 Minutes

## 2018-08-16 VITALS — SYSTOLIC BLOOD PRESSURE: 99 MMHG | DIASTOLIC BLOOD PRESSURE: 55 MMHG

## 2018-08-16 LAB
ALBUMIN SERPL-MCNC: 2.6 G/DL (ref 3.5–5)
ANION GAP SERPL CALC-SCNC: 8 MMOL/L (ref 5–19)
BUN SERPL-MCNC: 8 MG/DL (ref 7–20)
CALCIUM: 8 MG/DL (ref 8.4–10.2)
CHLORIDE SERPL-SCNC: 110 MMOL/L (ref 98–107)
CO2 SERPL-SCNC: 23 MMOL/L (ref 22–30)
ERYTHROCYTE [DISTWIDTH] IN BLOOD BY AUTOMATED COUNT: 13.5 % (ref 11.5–14)
GLUCOSE SERPL-MCNC: 94 MG/DL (ref 75–110)
HCT VFR BLD CALC: 30.6 % (ref 37.9–51)
HGB BLD-MCNC: 11.1 G/DL (ref 13.5–17)
MCH RBC QN AUTO: 33.8 PG (ref 27–33.4)
MCHC RBC AUTO-ENTMCNC: 36.3 G/DL (ref 32–36)
MCV RBC AUTO: 93 FL (ref 80–97)
PHOSPHATE SERPL-MCNC: 2.3 MG/DL (ref 2.5–4.5)
PLATELET # BLD: 76 10^3/UL (ref 150–450)
POTASSIUM SERPL-SCNC: 3.3 MMOL/L (ref 3.6–5)
RBC # BLD AUTO: 3.3 10^6/UL (ref 4.35–5.55)
SODIUM SERPL-SCNC: 140.5 MMOL/L (ref 137–145)
WBC # BLD AUTO: 3.8 10^3/UL (ref 4–10.5)

## 2018-08-16 RX ADMIN — METRONIDAZOLE SCH MG: 500 TABLET ORAL at 05:42

## 2018-08-16 RX ADMIN — METRONIDAZOLE SCH MG: 500 TABLET ORAL at 12:08

## 2018-08-16 RX ADMIN — HEPARIN SODIUM SCH: 5000 INJECTION, SOLUTION INTRAVENOUS; SUBCUTANEOUS at 05:36

## 2018-08-16 RX ADMIN — CIPROFLOXACIN SCH MLS/HR: 2 INJECTION INTRAVENOUS at 10:20

## 2018-08-16 RX ADMIN — HEPARIN SODIUM SCH: 5000 INJECTION, SOLUTION INTRAVENOUS; SUBCUTANEOUS at 14:35

## 2018-08-17 NOTE — PDOC DISCHARGE SUMMARY
General





- Admit/Disc Date/PCP


Admission Date/Primary Care Provider: 


  08/15/18 08:00





  AZEEM HUNG [facility]





Discharge Date: 08/16/18





- Discharge Diagnosis


(1) Colitis


Is this a current diagnosis for this admission?: Yes   


Summary: 


likely bacterial gastroenteritis secondary from recent food poisoning








(2) Dehydration


Is this a current diagnosis for this admission?: Yes   





(3) Fever


Is this a current diagnosis for this admission?: Yes   





(4) Nausea, vomiting and diarrhea


Is this a current diagnosis for this admission?: Yes   





- Additional Information


Resuscitation Status: Full Code


Discharge Diet: As Tolerated


Discharge Activity: Activity As Tolerated


Prescriptions: 


Ciprofloxacin HCl [Cipro 500 mg Tablet] 500 mg PO BID 10 Days #20 tablet


Metronidazole [Flagyl 500 mg Tablet] 500 mg PO TID 10 Days #30 tablet


Home Medications: 








Ciprofloxacin HCl [Cipro 500 mg Tablet] 500 mg PO BID 10 Days #20 tablet 08/16/ 18 


Metronidazole [Flagyl 500 mg Tablet] 500 mg PO TID 10 Days #30 tablet 08/16/18 











History of Present Illness


History of Present Illness: 


VANE AGUILERA is a 73 year old male with past medical history of remote 

prostate cancer with benign follow-up presents with 24 hours of abdominal pain 

nausea vomiting and diarrhea.  He admits fever of 102.  Denies suspect meals, 

recent travel, infectious contacts and otherwise feels well.  In the emergency 

room he is found to have leukocytosis and a CT abdomen pelvis remarkable for 

diffuse colitis.  He started on IV fluid, symptomatic management, empiric 

antibiotics and referred to the hospitalist for admission.  He denies previous 

episode or new medications.





Hospital Course


Hospital Course: 





Patient was found to have a diffuse colitis on CT abd/pelvis. A 1 cm 

cholelithiasis was noted at gallbladder neck on CT without exam findings 

consistent with cholecystitis. He was treated with IVF support and IV flagyl 

and IV ciprofloxacin. He gradually improved, and was able to restart and 

tolerate a diet. C diff testing was negative. At the time of discharge, there 

was no positive finding from his stool culture or blood cultures. It is 

suspected that he had bacterial gastroenteritis from food poisoning on the 

evening prior to his symptom development. At that time he described eating 

meatloaf with a "funny taste."  Patient will be given a course of flagyl and 

cipro to finish in outpatient. Followup with his primary doctor was recommended 

within the next week. At the time of discharge patient had stable vitals, and 

was ambulating without difficulty. He was instructed to eat yogurt during his 

antibiotic regiment.





Physical Exam


Vital Signs: 


 











Temp Pulse Resp BP Pulse Ox


 


 97.7 F   70   16   99/55 L  98 


 


 08/16/18 14:45  08/16/18 14:45  08/16/18 14:45  08/16/18 14:45  08/16/18 14:45








 Intake & Output











 08/15/18 08/16/18 08/17/18





 06:59 06:59 06:59


 


Intake Total   100


 


Balance   100











General appearance: PRESENT: no acute distress, cooperative


Head exam: PRESENT: atraumatic, normocephalic


Eye exam: PRESENT: EOMI, PERRLA


Ear exam: PRESENT: normal external ear exam.  ABSENT: bleeding


Mouth exam: PRESENT: moist.  ABSENT: dry mucosa


Throat exam: ABSENT: tonsillar exudate


Neck exam: ABSENT: carotid bruit, JVD


GI/Abdominal exam: PRESENT: soft.  ABSENT: tenderness


Musculoskeletal exam: PRESENT: ambulatory, full ROM


Neurological exam: PRESENT: alert, oriented to person, oriented to place, 

oriented to time, oriented to situation


Psychiatric exam: ABSENT: agitated, anxious


Focused psych exam: ABSENT: catatonic, paranoid


Skin exam: ABSENT: abrasion, pallor





Results


Laboratory Results: 





blood cultures x2 no growth


urine culture no signficant growth


stool culture no growth


C diff testing was negative. 


Impressions: 


 





Chest X-Ray  08/14/18 14:39


IMPRESSION:  NO ACUTE RADIOGRAPHIC FINDING IN THE CHEST.


 








CT Abd/Pelvis: showing a diffuse colitis, possibly infectious. 1 cm 

cholelithiasis at gallbladder neck found.





Qualifiers





- *


PATIENT BEING DISCHARGED WITH ANY OF THE FOLLOWING DIAGNOSIS: No





Plan


Discharge Plan: 





f/u with his PCP in the next 2 weeks. 


complete his antibiotic course as instructed


Time Spent: Greater than 30 Minutes

## 2018-12-28 ENCOUNTER — HOSPITAL ENCOUNTER (OUTPATIENT)
Dept: HOSPITAL 62 - LAB | Age: 73
End: 2018-12-28
Attending: RADIOLOGY
Payer: MEDICARE

## 2018-12-28 DIAGNOSIS — C61: Primary | ICD-10-CM

## 2018-12-28 PROCEDURE — 84153 ASSAY OF PSA TOTAL: CPT

## 2018-12-28 PROCEDURE — 36415 COLL VENOUS BLD VENIPUNCTURE: CPT

## 2020-03-04 ENCOUNTER — HOSPITAL ENCOUNTER (OUTPATIENT)
Dept: HOSPITAL 62 - LAB | Age: 75
End: 2020-03-04
Attending: PHYSICIAN ASSISTANT
Payer: MEDICARE

## 2020-03-04 DIAGNOSIS — I95.9: Primary | ICD-10-CM

## 2020-03-04 DIAGNOSIS — Z13.1: ICD-10-CM

## 2020-03-04 DIAGNOSIS — Z12.5: ICD-10-CM

## 2020-03-04 DIAGNOSIS — Z13.6: ICD-10-CM

## 2020-03-04 DIAGNOSIS — R53.83: ICD-10-CM

## 2020-03-04 LAB
ALBUMIN SERPL-MCNC: 4.3 G/DL (ref 3.5–5)
ALP SERPL-CCNC: 66 U/L (ref 38–126)
ANION GAP SERPL CALC-SCNC: 6 MMOL/L (ref 5–19)
AST SERPL-CCNC: 22 U/L (ref 17–59)
BILIRUB DIRECT SERPL-MCNC: 0 MG/DL (ref 0–0.4)
BILIRUB SERPL-MCNC: 0.7 MG/DL (ref 0.2–1.3)
BUN SERPL-MCNC: 12 MG/DL (ref 7–20)
CALCIUM: 9.2 MG/DL (ref 8.4–10.2)
CHLORIDE SERPL-SCNC: 105 MMOL/L (ref 98–107)
CHOLEST SERPL-MCNC: 196.31 MG/DL (ref 0–200)
CO2 SERPL-SCNC: 29 MMOL/L (ref 22–30)
ERYTHROCYTE [DISTWIDTH] IN BLOOD BY AUTOMATED COUNT: 13.8 % (ref 11.5–14)
GLUCOSE SERPL-MCNC: 98 MG/DL (ref 75–110)
HCT VFR BLD CALC: 39.5 % (ref 37.9–51)
HGB BLD-MCNC: 14.1 G/DL (ref 13.5–17)
LDLC SERPL DIRECT ASSAY-MCNC: 130 MG/DL (ref ?–100)
MCH RBC QN AUTO: 33.7 PG (ref 27–33.4)
MCHC RBC AUTO-ENTMCNC: 35.6 G/DL (ref 32–36)
MCV RBC AUTO: 95 FL (ref 80–97)
PLATELET # BLD: 172 10^3/UL (ref 150–450)
POTASSIUM SERPL-SCNC: 4.4 MMOL/L (ref 3.6–5)
PROT SERPL-MCNC: 7.7 G/DL (ref 6.3–8.2)
RBC # BLD AUTO: 4.18 10^6/UL (ref 4.35–5.55)
TRIGL SERPL-MCNC: 57 MG/DL (ref ?–150)
VLDLC SERPL CALC-MCNC: 11 MG/DL (ref 10–31)
WBC # BLD AUTO: 4.3 10^3/UL (ref 4–10.5)

## 2020-03-04 PROCEDURE — 36415 COLL VENOUS BLD VENIPUNCTURE: CPT

## 2020-03-04 PROCEDURE — 80053 COMPREHEN METABOLIC PANEL: CPT

## 2020-03-04 PROCEDURE — 85027 COMPLETE CBC AUTOMATED: CPT

## 2020-03-04 PROCEDURE — 80061 LIPID PANEL: CPT

## 2020-03-04 PROCEDURE — 83036 HEMOGLOBIN GLYCOSYLATED A1C: CPT

## 2020-03-04 PROCEDURE — 84443 ASSAY THYROID STIM HORMONE: CPT

## 2020-03-04 PROCEDURE — G0103 PSA SCREENING: HCPCS

## 2020-09-04 ENCOUNTER — HOSPITAL ENCOUNTER (INPATIENT)
Dept: HOSPITAL 62 - ER | Age: 75
LOS: 6 days | DRG: 177 | End: 2020-09-10
Attending: HOSPITALIST | Admitting: FAMILY MEDICINE
Payer: MEDICARE

## 2020-09-04 DIAGNOSIS — R94.5: ICD-10-CM

## 2020-09-04 DIAGNOSIS — Z82.49: ICD-10-CM

## 2020-09-04 DIAGNOSIS — E87.6: ICD-10-CM

## 2020-09-04 DIAGNOSIS — D65: ICD-10-CM

## 2020-09-04 DIAGNOSIS — Z51.5: ICD-10-CM

## 2020-09-04 DIAGNOSIS — D72.819: ICD-10-CM

## 2020-09-04 DIAGNOSIS — J96.01: ICD-10-CM

## 2020-09-04 DIAGNOSIS — Z60.2: ICD-10-CM

## 2020-09-04 DIAGNOSIS — U07.1: Primary | ICD-10-CM

## 2020-09-04 DIAGNOSIS — J12.89: ICD-10-CM

## 2020-09-04 DIAGNOSIS — Z83.3: ICD-10-CM

## 2020-09-04 DIAGNOSIS — Z82.61: ICD-10-CM

## 2020-09-04 DIAGNOSIS — Z66: ICD-10-CM

## 2020-09-04 LAB
ADD MANUAL DIFF: NO
ALBUMIN SERPL-MCNC: 3 G/DL (ref 3.5–5)
ALP SERPL-CCNC: 46 U/L (ref 38–126)
ANION GAP SERPL CALC-SCNC: 9 MMOL/L (ref 5–19)
APPEARANCE UR: (no result)
APTT PPP: (no result) S
AST SERPL-CCNC: 111 U/L (ref 17–59)
BASE EXCESS BLDV CALC-SCNC: 0.3 MMOL/L
BASOPHILS # BLD AUTO: 0 10^3/UL (ref 0–0.2)
BASOPHILS NFR BLD AUTO: 0 % (ref 0–2)
BILIRUB DIRECT SERPL-MCNC: 0.4 MG/DL (ref 0–0.4)
BILIRUB SERPL-MCNC: 0.8 MG/DL (ref 0.2–1.3)
BILIRUB UR QL STRIP: NEGATIVE
BUN SERPL-MCNC: 15 MG/DL (ref 7–20)
CALCIUM: 7.6 MG/DL (ref 8.4–10.2)
CHLORIDE SERPL-SCNC: 99 MMOL/L (ref 98–107)
CO2 SERPL-SCNC: 24 MMOL/L (ref 22–30)
EOSINOPHIL # BLD AUTO: 0 10^3/UL (ref 0–0.6)
EOSINOPHIL NFR BLD AUTO: 0 % (ref 0–6)
ERYTHROCYTE [DISTWIDTH] IN BLOOD BY AUTOMATED COUNT: 13.7 % (ref 11.5–14)
GLUCOSE SERPL-MCNC: 141 MG/DL (ref 75–110)
GLUCOSE UR STRIP-MCNC: NEGATIVE MG/DL
HCO3 BLDV-SCNC: 22.3 MMOL/L (ref 20–32)
HCT VFR BLD CALC: 33.1 % (ref 37.9–51)
HGB BLD-MCNC: 11.9 G/DL (ref 13.5–17)
KETONES UR STRIP-MCNC: (no result) MG/DL
LYMPHOCYTES # BLD AUTO: 0.4 10^3/UL (ref 0.5–4.7)
LYMPHOCYTES NFR BLD AUTO: 11.4 % (ref 13–45)
MCH RBC QN AUTO: 32.7 PG (ref 27–33.4)
MCHC RBC AUTO-ENTMCNC: 35.9 G/DL (ref 32–36)
MCV RBC AUTO: 91 FL (ref 80–97)
MONOCYTES # BLD AUTO: 0.3 10^3/UL (ref 0.1–1.4)
MONOCYTES NFR BLD AUTO: 8.5 % (ref 3–13)
NEUTROPHILS # BLD AUTO: 2.5 10^3/UL (ref 1.7–8.2)
NEUTS SEG NFR BLD AUTO: 80.1 % (ref 42–78)
NITRITE UR QL STRIP: NEGATIVE
NT PRO BNP: 515 PG/ML (ref ?–450)
PCO2 BLDV: 29.1 MMHG (ref 35–63)
PH BLDV: 7.5 [PH] (ref 7.3–7.42)
PH UR STRIP: 5 [PH] (ref 5–9)
PLATELET # BLD: 80 10^3/UL (ref 150–450)
POTASSIUM SERPL-SCNC: 3.4 MMOL/L (ref 3.6–5)
PROT SERPL-MCNC: 5.9 G/DL (ref 6.3–8.2)
PROT UR STRIP-MCNC: >=500 MG/DL
RBC # BLD AUTO: 3.64 10^6/UL (ref 4.35–5.55)
SP GR UR STRIP: 1.03
TOTAL CELLS COUNTED % (AUTO): 100 %
TROPONIN I SERPL-MCNC: 0.02 NG/ML
UROBILINOGEN UR-MCNC: 2 MG/DL (ref ?–2)
WBC # BLD AUTO: 3.1 10^3/UL (ref 4–10.5)

## 2020-09-04 PROCEDURE — 93005 ELECTROCARDIOGRAM TRACING: CPT

## 2020-09-04 PROCEDURE — 96375 TX/PRO/DX INJ NEW DRUG ADDON: CPT

## 2020-09-04 PROCEDURE — 84484 ASSAY OF TROPONIN QUANT: CPT

## 2020-09-04 PROCEDURE — 81001 URINALYSIS AUTO W/SCOPE: CPT

## 2020-09-04 PROCEDURE — 82803 BLOOD GASES ANY COMBINATION: CPT

## 2020-09-04 PROCEDURE — 36430 TRANSFUSION BLD/BLD COMPNT: CPT

## 2020-09-04 PROCEDURE — 93010 ELECTROCARDIOGRAM REPORT: CPT

## 2020-09-04 PROCEDURE — 83605 ASSAY OF LACTIC ACID: CPT

## 2020-09-04 PROCEDURE — 96365 THER/PROPH/DIAG IV INF INIT: CPT

## 2020-09-04 PROCEDURE — 82728 ASSAY OF FERRITIN: CPT

## 2020-09-04 PROCEDURE — 86901 BLOOD TYPING SEROLOGIC RH(D): CPT

## 2020-09-04 PROCEDURE — 86140 C-REACTIVE PROTEIN: CPT

## 2020-09-04 PROCEDURE — 87070 CULTURE OTHR SPECIMN AEROBIC: CPT

## 2020-09-04 PROCEDURE — 85379 FIBRIN DEGRADATION QUANT: CPT

## 2020-09-04 PROCEDURE — S0028 INJECTION, FAMOTIDINE, 20 MG: HCPCS

## 2020-09-04 PROCEDURE — 86022 PLATELET ANTIBODIES: CPT

## 2020-09-04 PROCEDURE — 93970 EXTREMITY STUDY: CPT

## 2020-09-04 PROCEDURE — 86900 BLOOD TYPING SEROLOGIC ABO: CPT

## 2020-09-04 PROCEDURE — 71045 X-RAY EXAM CHEST 1 VIEW: CPT

## 2020-09-04 PROCEDURE — 5A09357 ASSISTANCE WITH RESPIRATORY VENTILATION, LESS THAN 24 CONSECUTIVE HOURS, CONTINUOUS POSITIVE AIRWAY PRESSURE: ICD-10-PCS | Performed by: GENERAL ACUTE CARE HOSPITAL

## 2020-09-04 PROCEDURE — 85025 COMPLETE CBC W/AUTO DIFF WBC: CPT

## 2020-09-04 PROCEDURE — 80053 COMPREHEN METABOLIC PANEL: CPT

## 2020-09-04 PROCEDURE — 99285 EMERGENCY DEPT VISIT HI MDM: CPT

## 2020-09-04 PROCEDURE — 85610 PROTHROMBIN TIME: CPT

## 2020-09-04 PROCEDURE — 83735 ASSAY OF MAGNESIUM: CPT

## 2020-09-04 PROCEDURE — 36415 COLL VENOUS BLD VENIPUNCTURE: CPT

## 2020-09-04 PROCEDURE — 36600 WITHDRAWAL OF ARTERIAL BLOOD: CPT

## 2020-09-04 PROCEDURE — 85730 THROMBOPLASTIN TIME PARTIAL: CPT

## 2020-09-04 PROCEDURE — 94660 CPAP INITIATION&MGMT: CPT

## 2020-09-04 PROCEDURE — 85384 FIBRINOGEN ACTIVITY: CPT

## 2020-09-04 PROCEDURE — 87040 BLOOD CULTURE FOR BACTERIA: CPT

## 2020-09-04 PROCEDURE — 85027 COMPLETE CBC AUTOMATED: CPT

## 2020-09-04 PROCEDURE — 83880 ASSAY OF NATRIURETIC PEPTIDE: CPT

## 2020-09-04 SDOH — SOCIAL STABILITY - SOCIAL INSECURITY: PROBLEMS RELATED TO LIVING ALONE: Z60.2

## 2020-09-04 NOTE — PDOC H&P
History of Present Illness


Admission Date/PCP: 


09/04/2020  23:16


Oscar Polo PA-C


Patient complains of: Dyspnea


History of Present Illness: 


VANE AGUILERA is a 75 year old male who presented to the emergency room with 

acute dyspnea.  He admits a one-week history of moderate upper respiratory symp

toms including a nonproductive cough, intermittent fever and mild dyspnea with 

exertion which caused him to present to his primary care provider's office for 

COVID-19 screening yesterday.  He was notified today that he was positive for 

COVID-19.  He admits gradually worsening dyspnea becoming severe this evening.  

His dyspnea this evening was accompanied by a fever of 102.9 F and was noted to

become extremely severe with any exertion.  He denies other associated or 

accompanying signs and symptoms.  He denies prior similar episodes.  He has not 

identified any additional aggravating or ameliorating factors for his dyspnea.  

In the emergency room he was found to be severely hypoxic requiring supplemental

oxygen and eventually required noninvasive airway pressure support with BiPAP.  

He was subsequently admitted to the hospital for further evaluation and 

treatment.





Past Medical History


Cardiac Medical History: 


   Denies: Atrial Fibrillation, Congestive Heart Failure, Coronary Artery 

Disease, DVT, Myocardial Infarction, Hyperlipidema, Hypertension, Pulmonary 

Embolism


Pulmonary Medical History: 


   Denies: Asthma, Chronic Obstructive Pulmonary Disease (COPD)


EENT Medical History: 


   Denies: Cataracts, Ears - Hearing aids


Neurological Medical History: 


   Denies: Hemorrhagic CVA, Ischemic CVA, Seizures


Endocrine Medical History: 


   Denies: Diabetes Mellitus Type 1, Diabetes Mellitus Type 2, Hyperthyroidism, 

Hypothyroidism


Renal/ Medical History: 


   Denies: Chronic Kidney Disease, Nephrolithiasis


Malignancy Medical History: Reports: None


GI Medical History: Reports: Diverticulitis


   Denies: Cirrhosis, Crohn's Disease, Gastroesophageal Reflux Disease, 

Hepatitis, Peptic Ulcer Disease, Ulcerative Colitis


Musculoskeltal Medical History: 


   Denies: Arthritis, Gout


Skin Medical History: 


   Denies: Eczema, Psoriasis


Psychiatric Medical History: 


   Denies: Alcohol Dependency, Substance Abuse, Tobacco Dependency


Traumatic Medical History: Reports: None


Hematology: 


   Denies: Anemia, Bleeding Tendencies


Infectious Medical History: Reports: None





Past Surgical History


Past Surgical History: Reports: None





Social History


Information Source: Patient


Lives with: Alone


Smoking Status: Never Smoker


Electronic Cigarette use?: No


Frequency of Alcohol Use: None


Hx Recreational Drug Use: No


Drugs: None


Hx Prescription Drug Abuse: No





- Advance Directive


Resuscitation Status: Full Code


Surrogate healthcare decision maker:: 


Chuck Linares





Family History


Family History: Arthritis, CAD, DM, Hypertension


Parental Family History Reviewed: Yes


Children Family History Reviewed: No


Sibling(s) Family History Reviewed.: Yes





Medication/Allergy


Allergies/Adverse Reactions: 


                                        





No Known Allergies Allergy (Verified 08/14/18 14:31)


   











Review of Systems


Constitutional: PRESENT: as per HPI, chills, fever(s), other - Malaise


Eyes: ABSENT: visual disturbances, other - Eye pain


Ears: PRESENT: other - Ear pain.  ABSENT: hearing changes


Nose, Mouth, and Throat: ABSENT: headache(s), sore throat


Cardiovascular: PRESENT: as per HPI, dyspnea on exertion.  ABSENT: chest pain, 

palpitations


Respiratory: PRESENT: as per HPI, cough, dyspnea.  ABSENT: hemoptysis, sputum


Gastrointestinal: ABSENT: abdominal pain, constipation, diarrhea, nausea, 

vomiting


Genitourinary: ABSENT: dysuria, hematuria


Musculoskeletal: ABSENT: back pain, joint swelling, muscle weakness


Integumentary: ABSENT: pruritus, rash


Neurological: ABSENT: confusion, convulsions, focal weakness, memory loss, 

syncope


Psychiatric: ABSENT: anxiety, depression


Endocrine: ABSENT: cold intolerance, heat intolerance


Hematologic/Lymphatic: ABSENT: easy bleeding, easy bruising


Allergic/Immunologic: ABSENT: seasonal rhinorrhea





Physical Exam


Vital Signs: 


                                        











Temp Pulse Resp BP Pulse Ox


 


 99.3 F   92   13   129/65 H  97 


 


 09/04/20 20:40  09/04/20 20:40  09/04/20 21:01  09/04/20 21:00  09/04/20 21:01








                                 Intake & Output











 09/02/20 09/03/20 09/04/20





 23:59 23:59 23:59


 


Intake Total   50


 


Balance   50


 


Weight   92.533 kg











General appearance: PRESENT: no acute distress, cooperative, other - On BiPAP at

time of exam


Head exam: PRESENT: atraumatic, normocephalic


Eye exam: PRESENT: conjunctiva pink.  ABSENT: conjunctival injection, scleral 

icterus


Ear exam: PRESENT: normal external ear exam.  ABSENT: bleeding, drainage


Mouth exam: PRESENT: dry mucosa, neck supple


Neck exam: ABSENT: thyromegaly, tracheal deviation


Respiratory exam: PRESENT: rales - Scattered bibasilar coarse rales, rhonchi - 

Scattered bilateral central rhonchi, symmetrical, other - On BiPAP at the time 

of my exam


Cardiovascular exam: PRESENT: RRR.  ABSENT: clicks, gallop, rubs


Pulses: PRESENT: normal radial pulses, normal dorsalis pedis pul


Vascular exam: PRESENT: normal capillary refill.  ABSENT: pallor


GI/Abdominal exam: PRESENT: normal bowel sounds, soft.  ABSENT: tenderness


Rectal exam: PRESENT: deferred


Extremities exam: ABSENT: joint swelling, pedal edema


Musculoskeletal exam: ABSENT: deformity, dislocation


Neurological exam: PRESENT: alert, oriented to person, oriented to place, o

riented to time, oriented to situation, CN II-XII grossly intact.  ABSENT: motor

sensory deficit


Psychiatric exam: PRESENT: appropriate affect, normal mood


Skin exam: PRESENT: dry, intact, warm.  ABSENT: jaundice, rash, urticaria





Results


Laboratory Results: 


                                        





                                 09/04/20 20:20 





                                 09/04/20 20:20 





                                        











  09/04/20 09/04/20 09/04/20





  20:20 20:20 20:20


 


WBC  3.1 L  


 


RBC  3.64 L  


 


Hgb  11.9 L  


 


Hct  33.1 L  


 


MCV  91  


 


MCH  32.7  


 


MCHC  35.9  


 


RDW  13.7  


 


Plt Count  80 L  


 


Seg Neutrophils %  80.1 H  


 


VBG pH    7.50 H


 


VBG pCO2    29.1 L


 


VBG HCO3    22.3


 


VBG Base Excess    0.3


 


Sodium   131.6 L 


 


Potassium   3.4 L 


 


Chloride   99 


 


Carbon Dioxide   24 


 


Anion Gap   9 


 


BUN   15 


 


Creatinine   0.90 


 


Est GFR ( Amer)   > 60 


 


Glucose   141 H 


 


Calcium   7.6 L 


 


Total Bilirubin   0.8 


 


AST   111 H 


 


Alkaline Phosphatase   46 


 


Total Protein   5.9 L 


 


Albumin   3.0 L 


 


Urine Color   


 


Urine Appearance   


 


Urine pH   


 


Ur Specific Gravity   


 


Urine Protein   


 


Urine Glucose (UA)   


 


Urine Ketones   


 


Urine Blood   


 


Urine Nitrite   


 


Ur Leukocyte Esterase   


 


Urine WBC (Auto)   


 


Urine RBC (Auto)   














  09/04/20





  22:48


 


WBC 


 


RBC 


 


Hgb 


 


Hct 


 


MCV 


 


MCH 


 


MCHC 


 


RDW 


 


Plt Count 


 


Seg Neutrophils % 


 


VBG pH 


 


VBG pCO2 


 


VBG HCO3 


 


VBG Base Excess 


 


Sodium 


 


Potassium 


 


Chloride 


 


Carbon Dioxide 


 


Anion Gap 


 


BUN 


 


Creatinine 


 


Est GFR (African Amer) 


 


Glucose 


 


Calcium 


 


Total Bilirubin 


 


AST 


 


Alkaline Phosphatase 


 


Total Protein 


 


Albumin 


 


Urine Color  BANDAR


 


Urine Appearance  SLIGHTLY-CLOUDY


 


Urine pH  5.0


 


Ur Specific Gravity  1.031


 


Urine Protein  >=500 H


 


Urine Glucose (UA)  NEGATIVE


 


Urine Ketones  TRACE H


 


Urine Blood  SMALL H


 


Urine Nitrite  NEGATIVE


 


Ur Leukocyte Esterase  NEGATIVE


 


Urine WBC (Auto)  3


 


Urine RBC (Auto)  1








                                        











  09/04/20





  20:20


 


Troponin I  0.018


 


NT-Pro-B Natriuret Pep  515 H











Impressions: 


                                        





Chest X-Ray  09/04/20 20:45


IMPRESSION:


No acute process.


 














Assessment and Plan





- Diagnosis


(1) Respiratory tract infection due to COVID-19 virus


Is this a current diagnosis for this admission?: Yes   





(2) Acute respiratory failure with hypoxia


Is this a current diagnosis for this admission?: Yes   





(3) Hyponatremia


Is this a current diagnosis for this admission?: Yes   





(4) Hypokalemia


Is this a current diagnosis for this admission?: Yes   





(5) Fever


Qualifiers: 


   Fever type: due to other condition   Qualified Code(s): R50.81 - Fever 

presenting with conditions classified elsewhere   


Is this a current diagnosis for this admission?: Yes   





- Plan Summary


Summary: 


Patient will be admitted to [facility] where he received routine supportive and 

symptomatic cares.  He will receive IV antibiotics utilizing Rocephin and 

azithromycin.  He will receive supplemental oxygen utilizing BiPAP in order to 

maintain an adequate oxygen saturation.  He will receive Decadron 6 mg initially

followed by 2 mg every 8 hours.  He received Ativan 1 mg IV every 4 hours as 

needed for anxiety or restlessness.  He received morphine sulfate 2 to 4 mg IV 

every 2 hours as needed for pain.  He will be maintained on a regular diet.  He 

will be given IV fluids utilizing D5 NS with 20 mEq of KCl per liter at 167 

mL/h.  CBCs, metabolic profiles and additional laboratory and/or radiographic 

evaluations to be obtained as needed.





- Time


Time Spent with patient: Less than 15 minutes


Medications reviewed and adjusted accordingly: Yes


Anticipated Discharge Disposition: Home, Self Care


Anticipated Discharge Timeframe: Undetermined





- Inpatient Certification


Based on my medical assessment, after consideration of the patient's 

comorbidities, presenting symptoms, or acuity I expect that the services needed 

warrant INPATIENT care.: Yes


I certify that my determination is in accordance with my understanding of 

Medicare's requirements for reasonable and necessary INPATIENT services [42 CFR 

412.3e].: Yes


Medical Necessity: Need Close Monitoring Due to Risk of Patient Decompensation, 

Need For IV Fluids, Need For Continuous Telemetry Monitoring, Need for IV 

Antibiotics, Risk of Complication if Not Cared For in Hospital, Other - Need for

noninvasive airway pressure support and oxygen

## 2020-09-04 NOTE — RADIOLOGY REPORT (SQ)
EXAM DESCRIPTION:

X-ray, single view of the chest



CLINICAL HISTORY:

75 years Male, difficulty breathing, hypoxia, fever



COMPARISON: Two views of the chest 8/14/2018



FINDINGS:

Lungs: Lung volumes are preserved. No focal consolidation. No

pneumothorax or pleural effusion.

Mediastinum: Cardiac silhouette appears enlarged but this is

probably secondary to AP portable technique. Mild widening of the

mediastinum is also most likely technical.

Bones: Osseous structures are normal.



IMPRESSION:

No acute process.

## 2020-09-04 NOTE — ER DOCUMENT REPORT
ED Respiratory Problem





- General


Chief Complaint: Shortness Of Breath


Stated Complaint: SHORTNESS OF BREATH


Time Seen by Provider: 09/04/20 20:34


Notes: 


Patient is a 75-year-old male that comes to the emergency department for chief 

complaint of difficulty breathing.  Patient has had several days of sick 

symptoms including cough, fevers, shortness of breath.  He tested positive with 

primary care on Thursday for COVID-19 although his symptoms significantly 

worsened tonight.  He came by EMS, he was noted to have a fever of 102.9 F, 

initial oxygen saturation was in the low 80s, he was given 925 mg of Tylenol and

1 L normal saline bolus.  Patient was initially placed on 4 L nasal cannula but 

when he was still hypoxic he was transitioned to a nonrebreather.  Patient 

denies history of COPD, CHF, CAD.  He lives at home, takes no daily medications,

denies any surgeries, states he has not smoked in several decades (and never did

so consistently).





TRAVEL OUTSIDE OF THE [place] IN LAST 30 DAYS: No





- Related Data


Allergies/Adverse Reactions: 


                                        





No Known Allergies Allergy (Verified 08/14/18 14:31)


   











Past Medical History





- General


Information source: Patient





- Social History


Smoking Status: Never Smoker


Frequency of alcohol use: None


Drug Abuse: None


Lives with: Family


Family History: Other - Arthritis, CAD, DM, Hypertension


Renal/ Medical History: Denies: Hx Peritoneal Dialysis





- Immunizations


Hx Diphtheria, Pertussis, Tetanus Vaccination: Yes





Review of Systems





- Review of Systems


Constitutional: See HPI


EENT: No symptoms reported


Cardiovascular: See HPI


Respiratory: See HPI


Gastrointestinal: No symptoms reported


Genitourinary: No symptoms reported


Male Genitourinary: No symptoms reported


Musculoskeletal: No symptoms reported


Skin: No symptoms reported


Hematologic/Lymphatic: No symptoms reported


Neurological/Psychological: No symptoms reported





Physical Exam





- Vital signs


Vitals: 


                                        











Resp Pulse Ox


 


 23 H  96 


 


 09/04/20 19:59  09/04/20 19:59














- Notes


Notes: 


GENERAL: Alert; respiratory distress


HEAD: Normocephalic, atraumatic.


EYES: Pupils equal, round, and reactive to light. Extraocular movements intact.


ENT: Oral mucosa moist, tongue midline. Oropharynx unremarkable. Airway patent. 


NECK: Full range of motion. Supple. Trachea midline. No lymphadenopathy.


LUNGS: Rales in bilateral mid to lower lung fields; respiratory distress with 

noted tachypnea, labored breathing


HEART: Regular rate and rhythm. No murmur


ABDOMEN: Soft, non-tender. Non-distended. 


EXTREMITIES: Moves all 4 extremities spontaneously. No edema, normal radial and 

dorsalis pedis pulses bilaterally. No cyanosis.


BACK: no cervical, thoracic, lumbar midline tenderness. No saddle anesthesia, 

normal distal neurovascular exam. Moves all extremities in full range of motion.


NEUROLOGICAL: Alert and oriented x3. Normal speech. Cranial nerves II through 

XII grossly intact. Strength 5/5 in all extremities. 


PSYCH: Normal affect, normal mood.


SKIN: Warm, dry, normal turgor. No rashes or lesions noted.








Course





- Re-evaluation


Re-evalutation: 





09/04/20 20:48


On my evaluation patient is alert, but in respiratory distress.  Patient with 

labored breathing, hypoxia on room air and on 4 L nasal cannula, patient is on 

nonrebreather and will be transitioned to BiPAP.  Patient with rales throughout 

all lower lung fields and still has some tachypnea although he is not in severe 

respiratory distress.  Work-up pending.





On BiPAP patient reevaluated and has no tachypnea, respiratory distress has 

resolved, patient states he feels completely better, no current complaints.  

Chest x-ray unremarkable, CBC shows leukopenia and thrombocytopenia.  Chemistry 

shows mildly elevated LFTs, BNP borderline, troponin unremarkable.  Overall with

patient's presentation of fever, COVID positive results, respiratory distress, 

acute hypoxia I suspect patient is developing pneumonia.  Patient has been 

covered for community-acquired pneumonia, started on Decadron, patient will 

require hospitalization for his oxygen requirement.  I discussed with patient, 

he states agreement with plan.





Discussed with Dr. Weiland, hospitalist, patient admitted to [facility] full 

admission.





- Vital Signs


Vital signs: 


                                        











Temp Pulse Resp BP Pulse Ox


 


 97.8 F   64   16   134/74 H  99 


 


 09/05/20 01:45  09/05/20 02:06  09/05/20 03:07  09/05/20 01:45  09/05/20 03:07














- Laboratory


Result Diagrams: 


                                 09/04/20 20:20





                                 09/04/20 20:20


Laboratory results interpreted by me: 


                                        











  09/04/20 09/04/20 09/04/20





  20:20 20:20 20:20


 


WBC  3.1 L  


 


RBC  3.64 L  


 


Hgb  11.9 L  


 


Hct  33.1 L  


 


Plt Count  80 L  


 


Lymph % (Auto)  11.4 L  


 


Absolute Lymphs (auto)  0.4 L  


 


Seg Neutrophils %  80.1 H  


 


VBG pH   


 


VBG pCO2   


 


Sodium   131.6 L 


 


Potassium   3.4 L 


 


Glucose   141 H 


 


Calcium   7.6 L 


 


AST   111 H 


 


ALT   72 H 


 


NT-Pro-B Natriuret Pep    515 H


 


Total Protein   5.9 L 


 


Albumin   3.0 L 


 


Urine Protein   


 


Urine Ketones   


 


Urine Blood   


 


Urine Urobilinogen   














  09/04/20 09/04/20





  20:20 22:48


 


WBC  


 


RBC  


 


Hgb  


 


Hct  


 


Plt Count  


 


Lymph % (Auto)  


 


Absolute Lymphs (auto)  


 


Seg Neutrophils %  


 


VBG pH  7.50 H 


 


VBG pCO2  29.1 L 


 


Sodium  


 


Potassium  


 


Glucose  


 


Calcium  


 


AST  


 


ALT  


 


NT-Pro-B Natriuret Pep  


 


Total Protein  


 


Albumin  


 


Urine Protein   >=500 H


 


Urine Ketones   TRACE H


 


Urine Blood   SMALL H


 


Urine Urobilinogen   2.0 H














Discharge





- Discharge


Clinical Impression: 


 Respiratory distress, Hypoxia, COVID-19





Fever


Qualifiers:


 Fever type: due to other condition Qualified Code(s): R50.81 - Fever presenting

with conditions classified elsewhere





Condition: Stable


Disposition: ADMITTED AS INPATIENT


Admitting Provider: Weiland (Hospitalist)


Unit Admitted: [facility]

## 2020-09-05 LAB
ADD MANUAL DIFF: NO
ALBUMIN SERPL-MCNC: 3 G/DL (ref 3.5–5)
ALBUMIN SERPL-MCNC: 3.1 G/DL (ref 3.5–5)
ALP SERPL-CCNC: 44 U/L (ref 38–126)
ALP SERPL-CCNC: 50 U/L (ref 38–126)
ANION GAP SERPL CALC-SCNC: 7 MMOL/L (ref 5–19)
ANION GAP SERPL CALC-SCNC: 8 MMOL/L (ref 5–19)
ARTERIAL BLOOD H2CO3: 0.87 MMOL/L (ref 1.05–1.35)
ARTERIAL BLOOD HCO3: 19.5 MMOL/L (ref 20–24)
ARTERIAL BLOOD PCO2: 28.9 MMHG (ref 35–45)
ARTERIAL BLOOD PH: 7.45 (ref 7.35–7.45)
ARTERIAL BLOOD PO2: 59.2 MMHG (ref 80–100)
ARTERIAL BLOOD TOTAL CO2: 20.4 MMOL/L (ref 23–27)
AST SERPL-CCNC: 95 U/L (ref 17–59)
AST SERPL-CCNC: 99 U/L (ref 17–59)
BASE EXCESS BLDA CALC-SCNC: -3.4 MMOL/L
BASOPHILS # BLD AUTO: 0 10^3/UL (ref 0–0.2)
BASOPHILS NFR BLD AUTO: 0.1 % (ref 0–2)
BILIRUB DIRECT SERPL-MCNC: 0.2 MG/DL (ref 0–0.4)
BILIRUB DIRECT SERPL-MCNC: 0.3 MG/DL (ref 0–0.4)
BILIRUB SERPL-MCNC: 0.5 MG/DL (ref 0.2–1.3)
BILIRUB SERPL-MCNC: 0.6 MG/DL (ref 0.2–1.3)
BUN SERPL-MCNC: 14 MG/DL (ref 7–20)
BUN SERPL-MCNC: 15 MG/DL (ref 7–20)
CALCIUM: 7.6 MG/DL (ref 8.4–10.2)
CALCIUM: 8 MG/DL (ref 8.4–10.2)
CHLORIDE SERPL-SCNC: 103 MMOL/L (ref 98–107)
CHLORIDE SERPL-SCNC: 105 MMOL/L (ref 98–107)
CO2 SERPL-SCNC: 24 MMOL/L (ref 22–30)
CO2 SERPL-SCNC: 25 MMOL/L (ref 22–30)
EOSINOPHIL # BLD AUTO: 0 10^3/UL (ref 0–0.6)
EOSINOPHIL NFR BLD AUTO: 0 % (ref 0–6)
ERYTHROCYTE [DISTWIDTH] IN BLOOD BY AUTOMATED COUNT: 13.8 % (ref 11.5–14)
ERYTHROCYTE [DISTWIDTH] IN BLOOD BY AUTOMATED COUNT: 14.1 % (ref 11.5–14)
GLUCOSE SERPL-MCNC: 136 MG/DL (ref 75–110)
GLUCOSE SERPL-MCNC: 169 MG/DL (ref 75–110)
HCT VFR BLD CALC: 35.6 % (ref 37.9–51)
HCT VFR BLD CALC: 36.2 % (ref 37.9–51)
HGB BLD-MCNC: 12.6 G/DL (ref 13.5–17)
HGB BLD-MCNC: 12.7 G/DL (ref 13.5–17)
LYMPHOCYTES # BLD AUTO: 0.4 10^3/UL (ref 0.5–4.7)
LYMPHOCYTES NFR BLD AUTO: 7.3 % (ref 13–45)
MCH RBC QN AUTO: 32.4 PG (ref 27–33.4)
MCH RBC QN AUTO: 32.6 PG (ref 27–33.4)
MCHC RBC AUTO-ENTMCNC: 34.9 G/DL (ref 32–36)
MCHC RBC AUTO-ENTMCNC: 35.7 G/DL (ref 32–36)
MCV RBC AUTO: 91 FL (ref 80–97)
MCV RBC AUTO: 93 FL (ref 80–97)
MONOCYTES # BLD AUTO: 0.4 10^3/UL (ref 0.1–1.4)
MONOCYTES NFR BLD AUTO: 7.6 % (ref 3–13)
NEUTROPHILS # BLD AUTO: 4.4 10^3/UL (ref 1.7–8.2)
NEUTS SEG NFR BLD AUTO: 85 % (ref 42–78)
PLATELET # BLD: 82 10^3/UL (ref 150–450)
PLATELET # BLD: 95 10^3/UL (ref 150–450)
POTASSIUM SERPL-SCNC: 4.1 MMOL/L (ref 3.6–5)
POTASSIUM SERPL-SCNC: 4.8 MMOL/L (ref 3.6–5)
PROT SERPL-MCNC: 6 G/DL (ref 6.3–8.2)
PROT SERPL-MCNC: 6.1 G/DL (ref 6.3–8.2)
RBC # BLD AUTO: 3.9 10^6/UL (ref 4.35–5.55)
RBC # BLD AUTO: 3.91 10^6/UL (ref 4.35–5.55)
SAO2 % BLDA: 92.2 % (ref 94–98)
TOTAL CELLS COUNTED % (AUTO): 100 %
WBC # BLD AUTO: 2.4 10^3/UL (ref 4–10.5)
WBC # BLD AUTO: 5.1 10^3/UL (ref 4–10.5)

## 2020-09-05 RX ADMIN — FAMOTIDINE SCH MG: 10 INJECTION INTRAVENOUS at 10:10

## 2020-09-05 RX ADMIN — HEPARIN SODIUM SCH: 5000 INJECTION, SOLUTION INTRAVENOUS; SUBCUTANEOUS at 05:28

## 2020-09-05 RX ADMIN — Medication SCH ML: at 22:43

## 2020-09-05 RX ADMIN — Medication SCH: at 14:23

## 2020-09-05 RX ADMIN — DEXAMETHASONE SODIUM PHOSPHATE SCH MG: 4 INJECTION, SOLUTION INTRAMUSCULAR; INTRAVENOUS at 05:22

## 2020-09-05 RX ADMIN — DEXTROSE MONOHYDRATE, SODIUM CHLORIDE, AND POTASSIUM CHLORIDE PRN MLS/HR: 50; 9; 1.49 INJECTION, SOLUTION INTRAVENOUS at 09:00

## 2020-09-05 RX ADMIN — FAMOTIDINE SCH MG: 10 INJECTION INTRAVENOUS at 02:11

## 2020-09-05 RX ADMIN — HEPARIN SODIUM SCH: 5000 INJECTION, SOLUTION INTRAVENOUS; SUBCUTANEOUS at 14:23

## 2020-09-05 RX ADMIN — ENOXAPARIN SODIUM SCH MG: 40 INJECTION SUBCUTANEOUS at 18:12

## 2020-09-05 RX ADMIN — DEXAMETHASONE SODIUM PHOSPHATE SCH MG: 4 INJECTION, SOLUTION INTRAMUSCULAR; INTRAVENOUS at 14:32

## 2020-09-05 RX ADMIN — DEXTROSE MONOHYDRATE, SODIUM CHLORIDE, AND POTASSIUM CHLORIDE PRN MLS/HR: 50; 9; 1.49 INJECTION, SOLUTION INTRAVENOUS at 02:12

## 2020-09-05 RX ADMIN — DEXAMETHASONE SODIUM PHOSPHATE SCH MG: 4 INJECTION, SOLUTION INTRAMUSCULAR; INTRAVENOUS at 21:40

## 2020-09-05 RX ADMIN — Medication SCH: at 05:28

## 2020-09-05 RX ADMIN — FAMOTIDINE SCH MG: 10 INJECTION INTRAVENOUS at 21:40

## 2020-09-05 NOTE — RADIOLOGY REPORT (SQ)
EXAM DESCRIPTION:  CHEST SINGLE VIEW



IMAGES COMPLETED DATE/TIME:  9/5/2020 4:34 pm



REASON FOR STUDY:  acute SOB, worsening hypoxia



COMPARISON:  Chest x-ray 9/4/2020.



EXAM PARAMETERS:  NUMBER OF VIEWS: One view.

TECHNIQUE: 2 frontal radiographic view of the chest acquired.

RADIATION DOSE: NA

LIMITATIONS: None.



FINDINGS:  LUNGS AND PLEURA: There are faint bilateral airspace opacities, right more than left.  No 
sizable pleural effusion or pneumothorax.

MEDIASTINUM AND HILAR STRUCTURES: No masses.  Contour normal.

HEART AND VASCULAR STRUCTURES: There is cardiomegaly.  There is mild interstitial edema.

BONES: No acute findings.

HARDWARE: None in the chest.



IMPRESSION:  Cardiomegaly.  Mild interstitial edema.  Faint bilateral airspace opacities, may be seco
ndary to mild pulmonary edema and/or multifocal pneumonia.



TECHNICAL DOCUMENTATION:  JOB ID:  [id number]

[id number]

 2011 [employer]- All Rights Reserved



Reading location - IP/workstation name: [email or web address]

## 2020-09-05 NOTE — EKG REPORT
SEVERITY:- ABNORMAL ECG -

SINUS RHYTHM

NONSPECIFIC T ABNORMALITIES, INFERIOR LEADS

:

Confirmed by: Ryan De Anda MD 05-Sep-2020 20:42:03

## 2020-09-05 NOTE — PROGRESS NOTE
Provider Note


Provider Note: 


Repeat EKG unchanged from prior and troponin is trending down. CXR shows 

interstitial edema versus multifocal PNA. VBG shows respiratory alkalosis and 

hypoxemia. It's possible that he had flash pulmonary edema. Will DC IV fluids 

and give Lasix 20 mg IV x1. Start high flow NC. Strict I/O.

## 2020-09-05 NOTE — EKG REPORT
SEVERITY:- ABNORMAL ECG -

SINUS RHYTHM

NONSPECIFIC T ABNORMALITIES, INFERIOR LEADS

:

Confirmed by: Ryan De Anda MD 05-Sep-2020 08:26:15

## 2020-09-05 NOTE — PDOC PROGRESS REPORT
Subjective


Progress Note for:: 09/05/20


Subjective:: 


Patient is anxious, hyperventilating, saying that he has diffuse anterior 

squeezing chest pain that began suddenly after eating. He calmed down very 

quickly after talking about his travels around the country and was able to speak

in complete sentences within 1 minute of me walking into the room. He states 

that he is a "health nut" and that he doesn't want to take any pills. He notes 

that he was prescribed Tylenol and Azithromycin x2 days prior to this admission 

and feels that these actually made him worse. 


Reason For Visit: 


RESPIRATORY TRACT INFECTION SECONDARY TO COVID 19





Physical Exam


Vital Signs: 


                                        











Temp Pulse Resp BP Pulse Ox


 


 98.2 F   76   32 H  103/57 L  90 L


 


 09/05/20 08:09  09/05/20 14:00  09/05/20 08:09  09/05/20 08:09  09/05/20 10:25








                                 Intake & Output











 09/04/20 09/05/20 09/06/20





 06:59 06:59 06:59


 


Intake Total  150 2314


 


Output Total   300


 


Balance  150 2014


 


Weight  93.6 kg 











Additional comments: 


General: appears younger than stated age


Head: normocephalic, atraumatic


Eyes: anicteric sclera


ENT: moist mucus memranes, no oropharyngeal erythema/exudate 


Neck: no lymphadenopathy 


Lungs: lots of transmitted upper airway sounds that clear somewhat with cough, 

lungs clear 


Heart: regular rate and rhythm, no murmurs/rubs/gallops


Abdomen: normoactive bowel sounds, distended but soft and non-tender


: no CVA tenderness, no suprapubic tenderness


Extremities: warm and well perfused, no edema


Neuro: A&Ox3


Skin: no rash





Results


Laboratory Results: 


                                        











  09/04/20 09/04/20 09/04/20





  20:20 20:20 20:20


 


WBC  3.1 L  


 


RBC  3.64 L  


 


Hgb  11.9 L  


 


Hct  33.1 L  


 


MCV  91  


 


MCH  32.7  


 


MCHC  35.9  


 


RDW  13.7  


 


Plt Count  80 L  


 


Seg Neutrophils %  80.1 H  


 


VBG pH    7.50 H


 


VBG pCO2    29.1 L


 


VBG HCO3    22.3


 


VBG Base Excess    0.3


 


Sodium   131.6 L 


 


Potassium   3.4 L 


 


Chloride   99 


 


Carbon Dioxide   24 


 


Anion Gap   9 


 


BUN   15 


 


Creatinine   0.90 


 


Est GFR ( Amer)   > 60 


 


Glucose   141 H 


 


Calcium   7.6 L 


 


Total Bilirubin   0.8 


 


AST   111 H 


 


Alkaline Phosphatase   46 


 


Total Protein   5.9 L 


 


Albumin   3.0 L 


 


Urine Color   


 


Urine Appearance   


 


Urine pH   


 


Ur Specific Gravity   


 


Urine Protein   


 


Urine Glucose (UA)   


 


Urine Ketones   


 


Urine Blood   


 


Urine Nitrite   


 


Ur Leukocyte Esterase   


 


Urine WBC (Auto)   


 


Urine RBC (Auto)   














  09/04/20 09/05/20 09/05/20





  22:48 06:30 06:30


 


WBC   Cancelled 


 


RBC   Cancelled 


 


Hgb   Cancelled 


 


Hct   Cancelled 


 


MCV   Cancelled 


 


MCH   Cancelled 


 


MCHC   Cancelled 


 


RDW   Cancelled 


 


Plt Count   Cancelled 


 


Seg Neutrophils %   


 


VBG pH   


 


VBG pCO2   


 


VBG HCO3   


 


VBG Base Excess   


 


Sodium    134.7 L


 


Potassium    4.1


 


Chloride    103


 


Carbon Dioxide    25


 


Anion Gap    7


 


BUN    15


 


Creatinine    0.75


 


Est GFR ( Amer)    > 60


 


Glucose    169 H


 


Calcium    7.6 L


 


Total Bilirubin    0.6


 


AST    99 H


 


Alkaline Phosphatase    44


 


Total Protein    6.1 L


 


Albumin    3.1 L


 


Urine Color  BANDAR  


 


Urine Appearance  SLIGHTLY-CLOUDY  


 


Urine pH  5.0  


 


Ur Specific Gravity  1.031  


 


Urine Protein  >=500 H  


 


Urine Glucose (UA)  NEGATIVE  


 


Urine Ketones  TRACE H  


 


Urine Blood  SMALL H  


 


Urine Nitrite  NEGATIVE  


 


Ur Leukocyte Esterase  NEGATIVE  


 


Urine WBC (Auto)  3  


 


Urine RBC (Auto)  1  














  09/05/20





  08:25


 


WBC  2.4 L


 


RBC  3.90 L


 


Hgb  12.7 L


 


Hct  35.6 L


 


MCV  91


 


MCH  32.6


 


MCHC  35.7


 


RDW  14.1 H


 


Plt Count  82 L


 


Seg Neutrophils % 


 


VBG pH 


 


VBG pCO2 


 


VBG HCO3 


 


VBG Base Excess 


 


Sodium 


 


Potassium 


 


Chloride 


 


Carbon Dioxide 


 


Anion Gap 


 


BUN 


 


Creatinine 


 


Est GFR (African Amer) 


 


Glucose 


 


Calcium 


 


Total Bilirubin 


 


AST 


 


Alkaline Phosphatase 


 


Total Protein 


 


Albumin 


 


Urine Color 


 


Urine Appearance 


 


Urine pH 


 


Ur Specific Gravity 


 


Urine Protein 


 


Urine Glucose (UA) 


 


Urine Ketones 


 


Urine Blood 


 


Urine Nitrite 


 


Ur Leukocyte Esterase 


 


Urine WBC (Auto) 


 


Urine RBC (Auto) 








                                        











  09/04/20





  20:20


 


Troponin I  0.018


 


NT-Pro-B Natriuret Pep  515 H











Impressions: 


                                        





Chest X-Ray  09/04/20 20:45


IMPRESSION:


No acute process.


 














Assessment and Plan





- Plan Summary


Summary: 


Mr. Fernandez is a 75 year old man who presented on 9/04/2020 with SOB, PADILLA and 

fever. He admits to a one-week history of moderate upper respiratory symptoms 

including a nonproductive cough, intermittent fever and dyspnea with exertion 

which caused him to present to his primary care provider's office for COVID-19 

screening on 9/03/2020. He was notified on 9/04/2020 that he was positive for 

COVID-19. In the emergency room he was found to be severely hypoxemic requiring 

supplemental oxygen and eventually required noninvasive airway pressure support 

with BiPAP. He was subsequently admitted for further evaluation and treatment.





Covid-19 Pneumonia: today, he is suddenly worse. It is unclear to me whether 

patient is anxious and hyperventilating as a result, or if this is cardiac or 

respiratory in origin. I will recheck CXR, EKG, ABG, CBC, CMP, troponin, lactate

now. Unfortunately, patient is refusing to take any medications to help him feel

better, but he calmed down considerably after just a short talk with me, which 

is reassuring. 


- check CBC with differential, CMP, CK, CRP, and ferritin daily


- check PT/INR, PTT, fibrinogen, and D-dimer every other day 


- dexamethasone 6 mg daily for 10 days or until discharge, whichever is shorter


- remdesivir: did not meet inclusion criteria due to clear CXR on admission


- DC antibiotics, continue to follow BCx results





DVT ppx: Lovenox





Code Status: DNR/DNI





- Time


Time Spent with patient: 35 or more minutes


Anticipated Discharge Disposition: Home, Self Care


Anticipated Discharge Timeframe: within 72 hours

## 2020-09-06 LAB
ADD MANUAL DIFF: NO
ALBUMIN SERPL-MCNC: 2.8 G/DL (ref 3.5–5)
ALP SERPL-CCNC: 48 U/L (ref 38–126)
ANION GAP SERPL CALC-SCNC: 6 MMOL/L (ref 5–19)
APTT BLD: 31.8 SEC (ref 23.5–35.8)
AST SERPL-CCNC: 80 U/L (ref 17–59)
BASOPHILS # BLD AUTO: 0 10^3/UL (ref 0–0.2)
BASOPHILS NFR BLD AUTO: 0 % (ref 0–2)
BILIRUB DIRECT SERPL-MCNC: 0.2 MG/DL (ref 0–0.4)
BILIRUB SERPL-MCNC: 0.5 MG/DL (ref 0.2–1.3)
BUN SERPL-MCNC: 19 MG/DL (ref 7–20)
CALCIUM: 8 MG/DL (ref 8.4–10.2)
CHLORIDE SERPL-SCNC: 107 MMOL/L (ref 98–107)
CO2 SERPL-SCNC: 23 MMOL/L (ref 22–30)
CRP SERPL-MCNC: 55.2 MG/L (ref ?–10)
D DIMER PPP FEU-MCNC: 1.29 UG/ML (ref 0–0.5)
EOSINOPHIL # BLD AUTO: 0 10^3/UL (ref 0–0.6)
EOSINOPHIL NFR BLD AUTO: 0 % (ref 0–6)
ERYTHROCYTE [DISTWIDTH] IN BLOOD BY AUTOMATED COUNT: 13.9 % (ref 11.5–14)
GLUCOSE SERPL-MCNC: 137 MG/DL (ref 75–110)
HCT VFR BLD CALC: 33 % (ref 37.9–51)
HGB BLD-MCNC: 11.9 G/DL (ref 13.5–17)
INR PPP: 1
LYMPHOCYTES # BLD AUTO: 0.5 10^3/UL (ref 0.5–4.7)
LYMPHOCYTES NFR BLD AUTO: 5.4 % (ref 13–45)
MCH RBC QN AUTO: 32.8 PG (ref 27–33.4)
MCHC RBC AUTO-ENTMCNC: 36 G/DL (ref 32–36)
MCV RBC AUTO: 91 FL (ref 80–97)
MONOCYTES # BLD AUTO: 0.4 10^3/UL (ref 0.1–1.4)
MONOCYTES NFR BLD AUTO: 4.9 % (ref 3–13)
NEUTROPHILS # BLD AUTO: 7.5 10^3/UL (ref 1.7–8.2)
NEUTS SEG NFR BLD AUTO: 89.7 % (ref 42–78)
PLATELET # BLD: 109 10^3/UL (ref 150–450)
POTASSIUM SERPL-SCNC: 4.3 MMOL/L (ref 3.6–5)
PROT SERPL-MCNC: 5.6 G/DL (ref 6.3–8.2)
PROTHROMBIN TIME: 13.4 SEC (ref 11.4–15.4)
RBC # BLD AUTO: 3.62 10^6/UL (ref 4.35–5.55)
TOTAL CELLS COUNTED % (AUTO): 100 %
WBC # BLD AUTO: 8.3 10^3/UL (ref 4–10.5)

## 2020-09-06 PROCEDURE — XW033E5 INTRODUCTION OF REMDESIVIR ANTI-INFECTIVE INTO PERIPHERAL VEIN, PERCUTANEOUS APPROACH, NEW TECHNOLOGY GROUP 5: ICD-10-PCS | Performed by: INTERNAL MEDICINE

## 2020-09-06 RX ADMIN — FAMOTIDINE SCH MG: 10 INJECTION INTRAVENOUS at 21:58

## 2020-09-06 RX ADMIN — DEXAMETHASONE SODIUM PHOSPHATE SCH MG: 4 INJECTION, SOLUTION INTRAMUSCULAR; INTRAVENOUS at 13:58

## 2020-09-06 RX ADMIN — Medication SCH ML: at 06:19

## 2020-09-06 RX ADMIN — DEXAMETHASONE SODIUM PHOSPHATE SCH MG: 4 INJECTION, SOLUTION INTRAMUSCULAR; INTRAVENOUS at 21:58

## 2020-09-06 RX ADMIN — Medication SCH ML: at 13:58

## 2020-09-06 RX ADMIN — Medication SCH: at 23:32

## 2020-09-06 RX ADMIN — FAMOTIDINE SCH MG: 10 INJECTION INTRAVENOUS at 09:10

## 2020-09-06 RX ADMIN — DEXAMETHASONE SODIUM PHOSPHATE SCH MG: 4 INJECTION, SOLUTION INTRAMUSCULAR; INTRAVENOUS at 06:19

## 2020-09-06 RX ADMIN — ENOXAPARIN SODIUM SCH MG: 40 INJECTION SUBCUTANEOUS at 09:09

## 2020-09-06 NOTE — PDOC PROGRESS REPORT
Subjective


Progress Note for:: 09/06/20


Subjective:: 





Patient is resting on high flow nasal canula. Not in extremis but definitely 

dyspneic


Reason For Visit: 


RESPIRATORY TRACT INFECTION SECONDARY TO COVID 19,








Physical Exam


Vital Signs: 


                                        











Temp Pulse Resp BP Pulse Ox


 


 97.9 F   83   16   130/70 H  92 


 


 09/06/20 10:00  09/06/20 14:00  09/06/20 08:41  09/06/20 08:41  09/06/20 08:41








                                 Intake & Output











 09/05/20 09/06/20 09/07/20





 06:59 06:59 06:59


 


Intake Total 150 3714 


 


Output Total  1500 


 


Balance 150 2214 


 


Weight 93.6 kg 95.1 kg 











General appearance: PRESENT: cooperative, well-developed


Head exam: PRESENT: atraumatic, normocephalic


Ear exam: PRESENT: normal external ear exam.  ABSENT: bleeding, drainage


Mouth exam: PRESENT: moist, tongue midline


Teeth exam: ABSENT: poor dentation


Respiratory exam: PRESENT: rales - Fine rales, symmetrical, unlabored.  ABSENT: 

rhonchi, tachypnea, wheezes


Cardiovascular exam: PRESENT: RRR, +S1, +S2.  ABSENT: bradycardia, diastolic 

murmur, irregular rhythm, systolic murmur, tachycardia


GI/Abdominal exam: PRESENT: normal bowel sounds, soft.  ABSENT: distended, 

guarding, tenderness


Rectal exam: PRESENT: deferred


Gentrourinary exam: ABSENT: indwelling catheter


Extremities exam: ABSENT: pedal edema


Musculoskeletal exam: PRESENT: normal inspection.  ABSENT: deformity, 

dislocation


Neurological exam: PRESENT: alert, awake, oriented to person, oriented to place,

oriented to time, oriented to situation, CN II-XII grossly intact.  ABSENT: alte

red


Psychiatric exam: PRESENT: appropriate affect.  ABSENT: agitated, anxious


Focused psych exam: ABSENT: delusional, paranoid, restlessness





Results


Laboratory Results: 


                                        





                                 09/06/20 03:46 





                                 09/06/20 03:46 





                                        











  09/05/20 09/05/20 09/05/20





  15:30 15:40 15:40


 


WBC   5.1  D 


 


RBC   3.91 L 


 


Hgb   12.6 L 


 


Hct   36.2 L 


 


MCV   93 


 


MCH   32.4 


 


MCHC   34.9 


 


RDW   13.8 


 


Plt Count   95 L 


 


Seg Neutrophils %   85.0 H 


 


Carbonic Acid  0.87 L  


 


HCO3/H2CO3 Ratio  22:1  


 


ABG pH  7.45  


 


ABG pCO2  28.9 L  


 


ABG pO2  59.2 L  


 


ABG HCO3  19.5 L  


 


ABG O2 Saturation  92.2 L  


 


ABG Base Excess  -3.4  


 


FiO2  44%  


 


Sodium    136.8 L


 


Potassium    4.8


 


Chloride    105


 


Carbon Dioxide    24


 


Anion Gap    8


 


BUN    14


 


Creatinine    0.73


 


Est GFR ( Amer)    > 60


 


Glucose    136 H


 


Lactic Acid   


 


Calcium    8.0 L


 


Magnesium    2.5 H


 


Ferritin   


 


Total Bilirubin    0.5


 


AST    95 H


 


Alkaline Phosphatase    50


 


C-Reactive Protein   


 


Total Protein    6.0 L


 


Albumin    3.0 L














  09/05/20 09/05/20 09/05/20





  15:40 19:20 21:45


 


WBC   


 


RBC   


 


Hgb   


 


Hct   


 


MCV   


 


MCH   


 


MCHC   


 


RDW   


 


Plt Count   


 


Seg Neutrophils %   


 


Carbonic Acid   


 


HCO3/H2CO3 Ratio   


 


ABG pH   


 


ABG pCO2   


 


ABG pO2   


 


ABG HCO3   


 


ABG O2 Saturation   


 


ABG Base Excess   


 


FiO2   


 


Sodium   


 


Potassium   


 


Chloride   


 


Carbon Dioxide   


 


Anion Gap   


 


BUN   


 


Creatinine   


 


Est GFR (African Amer)   


 


Glucose   


 


Lactic Acid  2.3 H  2.4 H  2.2 H


 


Calcium   


 


Magnesium   


 


Ferritin   


 


Total Bilirubin   


 


AST   


 


Alkaline Phosphatase   


 


C-Reactive Protein   


 


Total Protein   


 


Albumin   














  09/06/20 09/06/20 09/06/20





  00:35 03:46 03:46


 


WBC   8.3 


 


RBC   3.62 L 


 


Hgb   11.9 L 


 


Hct   33.0 L 


 


MCV   91 


 


MCH   32.8 


 


MCHC   36.0 


 


RDW   13.9 


 


Plt Count   109 L 


 


Seg Neutrophils %   89.7 H 


 


Carbonic Acid   


 


HCO3/H2CO3 Ratio   


 


ABG pH   


 


ABG pCO2   


 


ABG pO2   


 


ABG HCO3   


 


ABG O2 Saturation   


 


ABG Base Excess   


 


FiO2   


 


Sodium    136.4 L


 


Potassium    4.3


 


Chloride    107


 


Carbon Dioxide    23


 


Anion Gap    6


 


BUN    19


 


Creatinine    0.79


 


Est GFR ( Amer)    > 60


 


Glucose    137 H


 


Lactic Acid  1.7  


 


Calcium    8.0 L


 


Magnesium   


 


Ferritin    1000.00 H


 


Total Bilirubin    0.5


 


AST    80 H


 


Alkaline Phosphatase    48


 


C-Reactive Protein    55.2 H


 


Total Protein    5.6 L


 


Albumin    2.8 L














  09/06/20





  03:46


 


WBC 


 


RBC 


 


Hgb 


 


Hct 


 


MCV 


 


MCH 


 


MCHC 


 


RDW 


 


Plt Count 


 


Seg Neutrophils % 


 


Carbonic Acid 


 


HCO3/H2CO3 Ratio 


 


ABG pH 


 


ABG pCO2 


 


ABG pO2 


 


ABG HCO3 


 


ABG O2 Saturation 


 


ABG Base Excess 


 


FiO2 


 


Sodium 


 


Potassium 


 


Chloride 


 


Carbon Dioxide 


 


Anion Gap 


 


BUN 


 


Creatinine 


 


Est GFR (African Amer) 


 


Glucose 


 


Lactic Acid  1.7


 


Calcium 


 


Magnesium 


 


Ferritin 


 


Total Bilirubin 


 


AST 


 


Alkaline Phosphatase 


 


C-Reactive Protein 


 


Total Protein 


 


Albumin 








                                        











  09/04/20 09/05/20 09/05/20





  20:20 15:40 15:40


 


Troponin I  0.018  < 0.012 


 


NT-Pro-B Natriuret Pep  515 H   367











Impressions: 


                                        





Chest X-Ray  09/05/20 00:00


IMPRESSION:  Cardiomegaly.  Mild interstitial edema.  Faint bilateral airspace 

opacities, may be secondary to mild pulmonary edema and/or multifocal pneumonia.


 














Assessment and Plan





- Diagnosis


(1) Acute respiratory failure with hypoxia


Is this a current diagnosis for this admission?: Yes   


Plan: 


Stable on high flow nasal canula. Will try to slowly taper back to room air








(2) Respiratory tract infection due to COVID-19 virus


Is this a current diagnosis for this admission?: Yes   


Plan: 


Remdisivir started. Continue antibiotics, steroids and supplements








(3) Leukopenia


Qualifiers: 


   Neutropenia type: unspecified 


Is this a current diagnosis for this admission?: Yes   


Plan: 


likely secondary to viral infection. Monitor CBC








(4) Elevated liver enzymes


Is this a current diagnosis for this admission?: Yes   


Plan: 


Secondary to infection. Continue to monitor








- Plan Summary


Summary: 


Mr. Fernandez is a 75 year old man who presented on 9/04/2020 with SOB, PADILLA and 

fever. He admits to a one-week history of moderate upper respiratory symptoms 

including a nonproductive cough, intermittent fever and dyspnea with exertion 

which caused him to present to his primary care provider's office for COVID-19 

screening on 9/03/2020. He was notified on 9/04/2020 that he was positive for 

COVID-19. In the emergency room he was found to be severely hypoxemic requiring 

supplemental oxygen and eventually required noninvasive airway pressure support 

with BiPAP. He was subsequently admitted for further evaluation and treatment.





Covid-19 Pneumonia: today, he is suddenly worse. It is unclear to me whether 

patient is anxious and hyperventilating as a result, or if this is cardiac or 

respiratory in origin. I will recheck CXR, EKG, ABG, CBC, CMP, troponin, lactate

now. Unfortunately, patient is refusing to take any medications to help him feel

better, but he calmed down considerably after just a short talk with me, which 

is reassuring. 


- check CBC with differential, CMP, CK, CRP, and ferritin daily


- check PT/INR, PTT, fibrinogen, and D-dimer every other day 


- dexamethasone 6 mg daily for 10 days or until discharge, whichever is shorter


- remdesivir: did not meet inclusion criteria due to clear CXR on admission


- DC antibiotics, continue to follow BCx results





DVT ppx: Lovenox





Code Status: DNR/DNI





- Time


Time Spent with patient: 15-24 minutes


Medications reviewed and adjusted accordingly: Yes


Anticipated Discharge Disposition: Home, Self Care


Anticipated Discharge Timeframe: unknown

## 2020-09-07 LAB
ARTERIAL BLOOD FIO2: (no result)
ARTERIAL BLOOD H2CO3: 0.87 MMOL/L (ref 1.05–1.35)
ARTERIAL BLOOD HCO3: 19.9 MMOL/L (ref 20–24)
ARTERIAL BLOOD PCO2: 28.8 MMHG (ref 35–45)
ARTERIAL BLOOD PH: 7.46 (ref 7.35–7.45)
ARTERIAL BLOOD PO2: 41.6 MMHG (ref 80–100)
ARTERIAL BLOOD TOTAL CO2: 20.8 MMOL/L (ref 23–27)
BASE EXCESS BLDA CALC-SCNC: -2.9 MMOL/L
SAO2 % BLDA: 80.7 % (ref 94–98)

## 2020-09-07 RX ADMIN — DEXAMETHASONE SODIUM PHOSPHATE SCH MG: 4 INJECTION, SOLUTION INTRAMUSCULAR; INTRAVENOUS at 21:51

## 2020-09-07 RX ADMIN — DEXAMETHASONE SODIUM PHOSPHATE SCH MG: 4 INJECTION, SOLUTION INTRAMUSCULAR; INTRAVENOUS at 13:20

## 2020-09-07 RX ADMIN — FAMOTIDINE SCH MG: 10 INJECTION INTRAVENOUS at 21:51

## 2020-09-07 RX ADMIN — Medication SCH ML: at 21:52

## 2020-09-07 RX ADMIN — ENOXAPARIN SODIUM SCH MG: 40 INJECTION SUBCUTANEOUS at 09:49

## 2020-09-07 RX ADMIN — Medication SCH ML: at 05:29

## 2020-09-07 RX ADMIN — FAMOTIDINE SCH MG: 10 INJECTION INTRAVENOUS at 09:49

## 2020-09-07 RX ADMIN — REMDESIVIR SCH MLS/HR: 100 INJECTION, POWDER, LYOPHILIZED, FOR SOLUTION INTRAVENOUS at 10:14

## 2020-09-07 RX ADMIN — Medication SCH ML: at 13:20

## 2020-09-07 RX ADMIN — DEXAMETHASONE SODIUM PHOSPHATE SCH MG: 4 INJECTION, SOLUTION INTRAMUSCULAR; INTRAVENOUS at 05:28

## 2020-09-07 NOTE — PDOC PROGRESS REPORT
Subjective


Progress Note for:: 09/07/20


Subjective:: 





On BiPap currently. Reports feeling slightly worse then yesterday


Reason For Visit: 


RESPIRATORY TRACT INFECTION SECONDARY TO COVID 19,








Physical Exam


Vital Signs: 


                                        











Temp Pulse Resp BP Pulse Ox


 


 99.1 F   86   32 H  124/68   94 


 


 09/07/20 17:09  09/07/20 19:00  09/07/20 19:44  09/07/20 17:09  09/07/20 17:09








                                 Intake & Output











 09/06/20 09/07/20 09/08/20





 06:59 06:59 06:59


 


Intake Total 3714 1327 970


 


Output Total 1441 899 4109


 


Balance 2214 652 -30


 


Weight 95.1 kg 96.5 kg 











General appearance: PRESENT: cooperative, mild distress, well-developed, other -

BiPap mask in place


Head exam: PRESENT: atraumatic, normocephalic


Ear exam: PRESENT: normal external ear exam.  ABSENT: bleeding, drainage


Respiratory exam: PRESENT: clear to auscultation edna, tachypnea, unlabored.  

ABSENT: rales, rhonchi, symmetrical, wheezes


Cardiovascular exam: PRESENT: RRR, +S1, +S2.  ABSENT: bradycardia, diastolic 

murmur, irregular rhythm, systolic murmur, tachycardia


GI/Abdominal exam: PRESENT: normal bowel sounds, soft.  ABSENT: distended, 

guarding, tenderness


Rectal exam: PRESENT: deferred


Gentrourinary exam: ABSENT: indwelling catheter


Extremities exam: ABSENT: pedal edema


Musculoskeletal exam: PRESENT: ambulatory.  ABSENT: deformity, dislocation


Neurological exam: PRESENT: alert, awake, oriented to person, oriented to place,

oriented to time, oriented to situation, CN II-XII grossly intact.  ABSENT: 

altered


Psychiatric exam: PRESENT: appropriate affect.  ABSENT: agitated, anxious


Focused psych exam: ABSENT: delusional, paranoid, restlessness


Skin exam: PRESENT: dry, normal color, warm.  ABSENT: rash





Results


Laboratory Results: 


                                        





                                 09/06/20 03:46 





                                 09/06/20 03:46 





                                        











  09/07/20





  00:50


 


Carbonic Acid  0.87 L


 


HCO3/H2CO3 Ratio  22:1


 


ABG pH  7.46 H


 


ABG pCO2  28.8 L


 


ABG pO2  41.6 L


 


ABG HCO3  19.9 L


 


ABG O2 Saturation  80.7 L


 


ABG Base Excess  -2.9


 


FiO2  40L








                                        











  09/04/20 09/05/20 09/05/20





  20:20 15:40 15:40


 


Troponin I  0.018  < 0.012 


 


NT-Pro-B Natriuret Pep  515 H   367











Impressions: 


                                        





Chest X-Ray  09/05/20 00:00


IMPRESSION:  Cardiomegaly.  Mild interstitial edema.  Faint bilateral airspace 

opacities, may be secondary to mild pulmonary edema and/or multifocal pneumonia.


 














Assessment and Plan





- Diagnosis


(1) Acute respiratory failure with hypoxia


Is this a current diagnosis for this admission?: Yes   


Plan: 


Slightly more dyspneic requiring BiPaP. 








(2) Respiratory tract infection due to COVID-19 virus


Is this a current diagnosis for this admission?: Yes   


Plan: 


Continue Remdisivir, steroids and monitor closely. Continue to wean oxygen as 

tolerated








(3) Leukopenia


Qualifiers: 


   Neutropenia type: unspecified 


Is this a current diagnosis for this admission?: Yes   


Plan: 


WBC's normal. Continue to monitor








(4) Elevated liver enzymes


Is this a current diagnosis for this admission?: Yes   


Plan: 


stable. Continue to monitor








- Plan Summary


Summary: 


Mr. Fernandez is a 75 year old man who presented on 9/04/2020 with SOB, PADILLA and 

fever. He admits to a one-week history of moderate upper respiratory symptoms 

including a nonproductive cough, intermittent fever and dyspnea with exertion 

which caused him to present to his primary care provider's office for COVID-19 

screening on 9/03/2020. He was notified on 9/04/2020 that he was positive for 

COVID-19. In the emergency room he was found to be severely hypoxemic requiring 

supplemental oxygen and eventually required noninvasive airway pressure support 

with BiPAP. He was subsequently admitted for further evaluation and treatment.





Covid-19 Pneumonia: today, he is suddenly worse. It is unclear to me whether 

patient is anxious and hyperventilating as a result, or if this is cardiac or 

respiratory in origin. I will recheck CXR, EKG, ABG, CBC, CMP, troponin, lactate

now. Unfortunately, patient is refusing to take any medications to help him feel

better, but he calmed down considerably after just a short talk with me, which 

is reassuring. 


- check CBC with differential, CMP, CK, CRP, and ferritin daily


- check PT/INR, PTT, fibrinogen, and D-dimer every other day 


- dexamethasone 6 mg daily for 10 days or until discharge, whichever is shorter


- remdesivir: did not meet inclusion criteria due to clear CXR on admission


- DC antibiotics, continue to follow BCx results





DVT ppx: Lovenox





Code Status: DNR/DNI





- Time


Time Spent with patient: 15-24 minutes


Smoking Cessation Education: 3 to 10 minutes


Medications reviewed and adjusted accordingly: Yes


Anticipated Discharge Disposition: Home with Home Health


Anticipated Discharge Timeframe: unknown

## 2020-09-08 LAB
ABSOLUTE LYMPHOCYTES# (MANUAL): 0.4 10^3/UL (ref 0.5–4.7)
ABSOLUTE MONOCYTES # (MANUAL): 0.7 10^3/UL (ref 0.1–1.4)
ADD MANUAL DIFF: YES
ALBUMIN SERPL-MCNC: 2.7 G/DL (ref 3.5–5)
ALP SERPL-CCNC: 68 U/L (ref 38–126)
ANION GAP SERPL CALC-SCNC: 6 MMOL/L (ref 5–19)
ANISOCYTOSIS BLD QL SMEAR: SLIGHT
APTT BLD: 35.3 SEC (ref 23.5–35.8)
AST SERPL-CCNC: 67 U/L (ref 17–59)
BASOPHILS NFR BLD MANUAL: 0 % (ref 0–2)
BILIRUB DIRECT SERPL-MCNC: 0.4 MG/DL (ref 0–0.4)
BILIRUB SERPL-MCNC: 1.7 MG/DL (ref 0.2–1.3)
BUN SERPL-MCNC: 27 MG/DL (ref 7–20)
CALCIUM: 7.7 MG/DL (ref 8.4–10.2)
CHLORIDE SERPL-SCNC: 104 MMOL/L (ref 98–107)
CO2 SERPL-SCNC: 25 MMOL/L (ref 22–30)
DACRYOCYTES BLD QL SMEAR: SLIGHT
EOSINOPHIL NFR BLD MANUAL: 0 % (ref 0–6)
ERYTHROCYTE [DISTWIDTH] IN BLOOD BY AUTOMATED COUNT: 14.1 % (ref 11.5–14)
GLUCOSE SERPL-MCNC: 114 MG/DL (ref 75–110)
HCT VFR BLD CALC: 33.1 % (ref 37.9–51)
HGB BLD-MCNC: 11.9 G/DL (ref 13.5–17)
INR PPP: 1.45
MCH RBC QN AUTO: 32.9 PG (ref 27–33.4)
MCHC RBC AUTO-ENTMCNC: 36 G/DL (ref 32–36)
MCV RBC AUTO: 92 FL (ref 80–97)
MONOCYTES % (MANUAL): 7 % (ref 3–13)
OVALOCYTES BLD QL SMEAR: (no result)
PLATELET # BLD: 55 10^3/UL (ref 150–450)
PLATELET COMMENT: (no result)
POIKILOCYTOSIS BLD QL SMEAR: (no result)
POTASSIUM SERPL-SCNC: 4.4 MMOL/L (ref 3.6–5)
PROT SERPL-MCNC: 5.7 G/DL (ref 6.3–8.2)
PROTHROMBIN TIME: 17.8 SEC (ref 11.4–15.4)
RBC # BLD AUTO: 3.62 10^6/UL (ref 4.35–5.55)
SEGMENTED NEUTROPHILS % (MAN): 89 % (ref 42–78)
TOTAL CELLS COUNTED BLD: 100
VARIANT LYMPHS NFR BLD MANUAL: 4 % (ref 13–45)
WBC # BLD AUTO: 10.6 10^3/UL (ref 4–10.5)

## 2020-09-08 RX ADMIN — Medication SCH ML: at 22:31

## 2020-09-08 RX ADMIN — Medication SCH ML: at 13:50

## 2020-09-08 RX ADMIN — REMDESIVIR SCH MLS/HR: 100 INJECTION, POWDER, LYOPHILIZED, FOR SOLUTION INTRAVENOUS at 09:50

## 2020-09-08 RX ADMIN — Medication SCH ML: at 05:14

## 2020-09-08 RX ADMIN — DEXAMETHASONE SODIUM PHOSPHATE SCH MG: 4 INJECTION, SOLUTION INTRAMUSCULAR; INTRAVENOUS at 13:50

## 2020-09-08 RX ADMIN — FAMOTIDINE SCH MG: 10 INJECTION INTRAVENOUS at 09:50

## 2020-09-08 RX ADMIN — FAMOTIDINE SCH MG: 10 INJECTION INTRAVENOUS at 22:31

## 2020-09-08 RX ADMIN — DEXAMETHASONE SODIUM PHOSPHATE SCH MG: 4 INJECTION, SOLUTION INTRAMUSCULAR; INTRAVENOUS at 05:14

## 2020-09-08 RX ADMIN — DEXAMETHASONE SODIUM PHOSPHATE SCH MG: 4 INJECTION, SOLUTION INTRAMUSCULAR; INTRAVENOUS at 22:31

## 2020-09-08 RX ADMIN — ENOXAPARIN SODIUM SCH: 40 INJECTION SUBCUTANEOUS at 11:05

## 2020-09-08 NOTE — ADVANCED CARE
- Diagnosis


(1) Acute respiratory failure with hypoxia


Diagnosis Current: Yes   





(2) COVID-19


Diagnosis Current: Yes   


Resuscitation Status: Do Not Resuscitate


Discussion: 


Mr. Fernandez has reiterated that his goal is to maintain his independence and 

high functionality. He wants to keep his code status as DNR/DNI. He does not 

want "extraordinary measures" to keep him alive if he continues to decline. We 

discussed at length that he appears to be declining clinically despite maximal 

medical therapy. I am concerned that he will tire out soon due to tachypnea. 

Patient is alert, oriented, and has capacity to make medical decisions. He is 

able to talk to me about his wishes and options, and remains adamant that he 

does not want intubation or resuscitation if he continues to decline. I have 

asked him on multiple occasions if it is okay to talk to his family and give 

them an update, and he keeps saying that he doesn't want to "worry them" but 

agreed to let me call them today. 


Time Spent: >30 minutes

## 2020-09-08 NOTE — RADIOLOGY REPORT (SQ)
EXAM DESCRIPTION: 



US EXTREMITY VEINS BILATERAL



COMPLETED DATE/TME:  09/08/2020 00:00



CLINICAL HISTORY: 



75 years, Male, elevated d-dimer



COMPARISON:

None.



TECHNIQUE:





LIMITATIONS:

None.



FINDINGS:



There is nonocclusive thrombus in the right short saphenous vein.



The common femoral, femoral, popliteal, posterior tibial and

peroneal veins are patent and compressible bilaterally.



Venous Doppler waveforms are unremarkable.



IMPRESSION:



Superficial thrombus in the right short saphenous vein.



No evidence of deep venous thrombosis.

 



copyright 2011 [employer]- All Rights Reserved

## 2020-09-08 NOTE — PDOC PROGRESS REPORT
Subjective


Progress Note for:: 09/08/20


Subjective:: 


Patient with acutely worsening respiratory status overnight requiring BiPAP.  

Morning he notes that he suddenly felt more short of breath and endorses pain on

inspiration as well as right lower extremity calf pain.


Reason For Visit: 


RESPIRATORY TRACT INFECTION SECONDARY TO COVID 19





Physical Exam


Vital Signs: 


                                        











Temp Pulse Resp BP Pulse Ox


 


 99.4 F   82   33 H  126/71 H  96 


 


 09/08/20 08:23  09/08/20 08:23  09/08/20 08:23  09/08/20 08:23  09/08/20 08:23








                                 Intake & Output











 09/07/20 09/08/20 09/09/20





 06:59 06:59 06:59


 


Intake Total 1327 1170 


 


Output Total 675 1450 


 


Balance 652 -280 


 


Weight 96.5 kg 96.8 kg 











Additional comments: 


General: appears younger than stated age


Head: normocephalic, atraumatic


Eyes: anicteric sclera


ENT: dry mucus memranes, no oropharyngeal erythema/exudate 


Neck: no lymphadenopathy 


Lungs: diffuse rhonchi, patient unable to take deep breaths, frequent coughing 


Heart: regular rate and rhythm, no murmurs/rubs/gallops


Abdomen: normoactive bowel sounds, distended but soft and non-tender


: no CVA tenderness, no suprapubic tenderness


Extremities: warm and well perfused, no edema


Neuro: A&Ox2


Skin: no rash





Results


Laboratory Results: 


                                        





                                 09/08/20 08:46 





                                 09/08/20 08:46 





                                        











  09/08/20 09/08/20





  08:46 08:46


 


WBC  10.6 H 


 


RBC  3.62 L 


 


Hgb  11.9 L 


 


Hct  33.1 L 


 


MCV  92 


 


MCH  32.9 


 


MCHC  36.0 


 


RDW  14.1 H 


 


Plt Count  55 L 


 


Seg Neutrophils %  Not Reportable 


 


Sodium   134.9 L


 


Potassium   4.4


 


Chloride   104


 


Carbon Dioxide   25


 


Anion Gap   6


 


BUN   27 H


 


Creatinine   0.84


 


Est GFR (African Amer)   > 60


 


Glucose   114 H


 


Calcium   7.7 L


 


Magnesium   2.5 H


 


Total Bilirubin   1.7 H


 


AST   67 H


 


Alkaline Phosphatase   68


 


Total Protein   5.7 L


 


Albumin   2.7 L








                                        











  09/04/20 09/05/20 09/05/20





  20:20 15:40 15:40


 


Troponin I  0.018  < 0.012 


 


NT-Pro-B Natriuret Pep  515 H   367











Impressions: 


                                        





Chest X-Ray  09/05/20 00:00


IMPRESSION:  Cardiomegaly.  Mild interstitial edema.  Faint bilateral airspace 

opacities, may be secondary to mild pulmonary edema and/or multifocal pneumonia.


 














Assessment and Plan





- Plan Summary


Summary: 


Mr. Fernandez is a 75 year old man who presented on 9/04/2020 with SOB, PADILLA and 

fever. He admits to a one-week history of moderate upper respiratory symptoms 

including a nonproductive cough, intermittent fever and dyspnea with exertion 

which caused him to present to his primary care provider's office for COVID-19 

screening on 9/03/2020. He was notified on 9/04/2020 that he was positive for 

COVID-19. In the emergency room he was found to be severely hypoxemic requiring 

supplemental oxygen and eventually required noninvasive airway pressure support 

with BiPAP. He was subsequently admitted for further evaluation and treatment.





# Acute hypoxemic respiratory failure due to Covid-19 Pneumonia: still requring 

High Flow NC and occasionally BIPAP. Overnight, on 9/07-9/08 he became suddenly 

much more hypoxic/tachypnic. He appears worse today and labs are also worsening.




- check CBC with differential, CMP, CK, CRP, and ferritin daily


- check PT/INR, PTT, fibrinogen, and D-dimer every other day 


- dexamethasone 6 mg daily for 10 days or until discharge, whichever is shorter


- remdesivir: 200 mg intravenously on day 1 followed by 100 mg daily for 5 days 

total 


- antibiotics discontinued given negative BCx x2 and no other evidence of 

bacterial infection at this time





# Acute on Chronic Thrombocytopenia: worsening, down from 109 to 55 today. His 

acute complaints of RLE calf pain and worsening SOB and pain on inspiration are 

highly concerning for DVT/PE. He is very tachypnic and is developing a 

respiratory alkalosis, concerning for PE. Constellation of signs/symptoms are 

concerning for HIT. Extremely elevated D-dimer highly concerning for presence of

VTE, with or without HIT. 


- check duplex US of BLE to rule out DVT, if negative will pursue CTA chest to 

rule out PE 


- order HIT antibody (send out test) 


- DC Lovenox and avoid all heparin products given high level of suspicion 


- start argatroban (start at 2 mcg/kg/minute, check aPTT Q2H and adjust dose 

until the steady-state aPTT is 1.5 to 3 times the initial value, not exceeding 

100 seconds; dosage should not exceed 10 mcg/kg/minute) 





# Lactic Acidosis: resolved with IVF





# Flash pulmonary edema: due to IVF, which were discontinued. Resolved with 

Lasix IV. 





Code Status: DNR/DNI





- Time


Time Spent with patient: 35 or more minutes


Anticipated Discharge Disposition: Home, Self Care


Anticipated Discharge Timeframe: >72 hours





- Inpatient Certification


Medical Necessity: Failure to Improve With Outpatient Therapy, Need For IV 

Fluids, Need For Continuous Telemetry Monitoring, Risk of Complication if Not 

Cared For in Hospital, Risk of Diagnosis Which Will Require Inpatient 

Eval/Care/Monitoring

## 2020-09-09 LAB
ABSOLUTE LYMPHOCYTES# (MANUAL): 0 10^3/UL (ref 0.5–4.7)
ABSOLUTE MONOCYTES # (MANUAL): 0.1 10^3/UL (ref 0.1–1.4)
ADD MANUAL DIFF: YES
ALBUMIN SERPL-MCNC: 2.6 G/DL (ref 3.5–5)
ALP SERPL-CCNC: 84 U/L (ref 38–126)
ANION GAP SERPL CALC-SCNC: 5 MMOL/L (ref 5–19)
ANISOCYTOSIS BLD QL SMEAR: SLIGHT
APTT BLD: 74.5 SEC (ref 23.5–35.8)
ARTERIAL BLOOD FIO2: (no result)
ARTERIAL BLOOD H2CO3: 1.06 MMOL/L (ref 1.05–1.35)
ARTERIAL BLOOD HCO3: 24 MMOL/L (ref 20–24)
ARTERIAL BLOOD PCO2: 35.2 MMHG (ref 35–45)
ARTERIAL BLOOD PH: 7.45 (ref 7.35–7.45)
ARTERIAL BLOOD PO2: 57.8 MMHG (ref 80–100)
ARTERIAL BLOOD TOTAL CO2: 25.1 MMOL/L (ref 23–27)
AST SERPL-CCNC: 74 U/L (ref 17–59)
BASE EXCESS BLDA CALC-SCNC: 0.4 MMOL/L
BASOPHILS NFR BLD MANUAL: 0 % (ref 0–2)
BILIRUB DIRECT SERPL-MCNC: 0.8 MG/DL (ref 0–0.4)
BILIRUB SERPL-MCNC: 2.8 MG/DL (ref 0.2–1.3)
BUN SERPL-MCNC: 48 MG/DL (ref 7–20)
BURR CELLS BLD QL SMEAR: (no result)
CALCIUM: 7.8 MG/DL (ref 8.4–10.2)
CHLORIDE SERPL-SCNC: 104 MMOL/L (ref 98–107)
CO2 SERPL-SCNC: 25 MMOL/L (ref 22–30)
CRP SERPL-MCNC: 181.3 MG/L (ref ?–10)
D DIMER PPP FEU-MCNC: > 20 UG/ML (ref 0–0.5)
EOSINOPHIL NFR BLD MANUAL: 0 % (ref 0–6)
ERYTHROCYTE [DISTWIDTH] IN BLOOD BY AUTOMATED COUNT: 14.3 % (ref 11.5–14)
FIBRINOGEN PPP-MCNC: 126 MG/DL (ref 209–497)
GLUCOSE SERPL-MCNC: 125 MG/DL (ref 75–110)
HCT VFR BLD CALC: 31.3 % (ref 37.9–51)
HGB BLD-MCNC: 11.4 G/DL (ref 13.5–17)
MCH RBC QN AUTO: 32.9 PG (ref 27–33.4)
MCHC RBC AUTO-ENTMCNC: 36.3 G/DL (ref 32–36)
MCV RBC AUTO: 91 FL (ref 80–97)
MONOCYTES % (MANUAL): 1 % (ref 3–13)
OVALOCYTES BLD QL SMEAR: SLIGHT
PATH REV BLD -IMP: (no result)
PLATELET # BLD: 21 10^3/UL (ref 150–450)
PLATELET COMMENT: (no result)
POIKILOCYTOSIS BLD QL SMEAR: (no result)
POTASSIUM SERPL-SCNC: 4.4 MMOL/L (ref 3.6–5)
PROT SERPL-MCNC: 5.7 G/DL (ref 6.3–8.2)
RBC # BLD AUTO: 3.46 10^6/UL (ref 4.35–5.55)
SAO2 % BLDA: 91.5 % (ref 94–98)
SCHISTOCYTES BLD QL SMEAR: (no result)
SEGMENTED NEUTROPHILS % (MAN): 99 % (ref 42–78)
TOTAL CELLS COUNTED BLD: 100
VARIANT LYMPHS NFR BLD MANUAL: 0 % (ref 13–45)
WBC # BLD AUTO: 11.4 10^3/UL (ref 4–10.5)

## 2020-09-09 RX ADMIN — DEXAMETHASONE SODIUM PHOSPHATE SCH MG: 4 INJECTION, SOLUTION INTRAMUSCULAR; INTRAVENOUS at 23:05

## 2020-09-09 RX ADMIN — VANCOMYCIN HYDROCHLORIDE SCH MLS/HR: 1 INJECTION, POWDER, LYOPHILIZED, FOR SOLUTION INTRAVENOUS at 23:05

## 2020-09-09 RX ADMIN — Medication SCH: at 05:18

## 2020-09-09 RX ADMIN — DEXAMETHASONE SODIUM PHOSPHATE SCH MG: 4 INJECTION, SOLUTION INTRAMUSCULAR; INTRAVENOUS at 05:18

## 2020-09-09 RX ADMIN — CEFEPIME SCH MLS/HR: 2 INJECTION, POWDER, FOR SOLUTION INTRAMUSCULAR; INTRAVENOUS at 12:54

## 2020-09-09 RX ADMIN — REMDESIVIR SCH MLS/HR: 100 INJECTION, POWDER, LYOPHILIZED, FOR SOLUTION INTRAVENOUS at 10:53

## 2020-09-09 RX ADMIN — DEXAMETHASONE SODIUM PHOSPHATE SCH MG: 4 INJECTION, SOLUTION INTRAMUSCULAR; INTRAVENOUS at 14:18

## 2020-09-09 RX ADMIN — FAMOTIDINE SCH MG: 10 INJECTION INTRAVENOUS at 10:53

## 2020-09-09 RX ADMIN — Medication SCH ML: at 14:19

## 2020-09-09 RX ADMIN — FAMOTIDINE SCH MG: 10 INJECTION INTRAVENOUS at 23:06

## 2020-09-09 RX ADMIN — VANCOMYCIN HYDROCHLORIDE SCH MLS/HR: 1 INJECTION, POWDER, LYOPHILIZED, FOR SOLUTION INTRAVENOUS at 12:55

## 2020-09-09 NOTE — PDOC PROGRESS REPORT
Subjective


Progress Note for:: 09/09/20


Subjective:: 


He is feeling tired and worn out. He is unable to tolerate being off of BIPAP 

due to severe dyspnea. 


Reason For Visit: 


RESPIRATORY TRACT INFECTION SECONDARY TO COVID 19,








Physical Exam


Vital Signs: 


                                        











Temp Pulse Resp BP Pulse Ox


 


 98.1 F   83   24 H  133/74 H  95 


 


 09/09/20 07:43  09/09/20 07:43  09/09/20 11:24  09/09/20 07:43  09/09/20 11:24








                                 Intake & Output











 09/08/20 09/09/20 09/10/20





 06:59 06:59 06:59


 


Intake Total 1170 1207 300


 


Output Total 1450 1100 150


 


Balance -280 107 150


 


Weight 96.8 kg 96.8 kg 











Additional comments: 


General: severely dyspneic on BIPAP


Eyes: anicteric sclera


ENT: dry mucus memranes, no oropharyngeal erythema/exudate 


Neck: no lymphadenopathy 


Lungs: diffuse rhonchi, patient unable to take deep breaths, frequent coughing 


Heart: regular rate and rhythm, no murmurs/rubs/gallops


Abdomen: normoactive bowel sounds, distended but soft and non-tender


: no CVA tenderness, no suprapubic tenderness


Extremities: warm and well perfused, no edema


Neuro: A&Ox2


Skin: no rash





Results


Laboratory Results: 


                                        





                                 09/09/20 04:07 





                                 09/09/20 04:07 





                                        











  09/09/20 09/09/20 09/09/20





  04:07 04:07 11:10


 


WBC  11.4 H  


 


RBC  3.46 L  


 


Hgb  11.4 L  


 


Hct  31.3 L  


 


MCV  91  


 


MCH  32.9  


 


MCHC  36.3 H  


 


RDW  14.3 H  


 


Plt Count  21 L*  


 


Seg Neutrophils %  Not Reportable  


 


Carbonic Acid    1.06


 


HCO3/H2CO3 Ratio    22:1


 


ABG pH    7.45


 


ABG pCO2    35.2


 


ABG pO2    57.8 L


 


ABG HCO3    24.0


 


ABG O2 Saturation    91.5 L


 


ABG Base Excess    0.4


 


FiO2    100%


 


Sodium   134.0 L 


 


Potassium   4.4 


 


Chloride   104 


 


Carbon Dioxide   25 


 


Anion Gap   5 


 


BUN   48 H D 


 


Creatinine   1.17 


 


Est GFR ( Amer)   > 60 


 


Glucose   125 H 


 


Lactic Acid   


 


Calcium   7.8 L 


 


Magnesium   2.6 H 


 


Ferritin   2210.00 H 


 


Total Bilirubin   2.8 H 


 


AST   74 H 


 


Alkaline Phosphatase   84 


 


C-Reactive Protein   181.3 H 


 


Total Protein   5.7 L 


 


Albumin   2.6 L 


 


Blood Type   














  09/09/20 09/09/20





  11:27 11:36


 


WBC  


 


RBC  


 


Hgb  


 


Hct  


 


MCV  


 


MCH  


 


MCHC  


 


RDW  


 


Plt Count  


 


Seg Neutrophils %  


 


Carbonic Acid  


 


HCO3/H2CO3 Ratio  


 


ABG pH  


 


ABG pCO2  


 


ABG pO2  


 


ABG HCO3  


 


ABG O2 Saturation  


 


ABG Base Excess  


 


FiO2  


 


Sodium  


 


Potassium  


 


Chloride  


 


Carbon Dioxide  


 


Anion Gap  


 


BUN  


 


Creatinine  


 


Est GFR (African Amer)  


 


Glucose  


 


Lactic Acid  1.9 


 


Calcium  


 


Magnesium  


 


Ferritin  


 


Total Bilirubin  


 


AST  


 


Alkaline Phosphatase  


 


C-Reactive Protein  


 


Total Protein  


 


Albumin  


 


Blood Type   A POSITIVE








                                        











  09/04/20 09/05/20 09/05/20





  20:20 15:40 15:40


 


Troponin I  0.018  < 0.012 


 


NT-Pro-B Natriuret Pep  515 H   367











Impressions: 


                                        





Chest X-Ray  09/05/20 00:00


IMPRESSION:  Cardiomegaly.  Mild interstitial edema.  Faint bilateral airspace 

opacities, may be secondary to mild pulmonary edema and/or multifocal pneumonia.


 








Venous Doppler Study  09/08/20 00:00


IMPRESSION:


 


Superficial thrombus in the right short saphenous vein.


 


No evidence of deep venous thrombosis.


 


 


copyright 2011 [employer]- All Rights Reserved


 














Assessment and Plan





- Diagnosis


(1) Acute respiratory failure with hypoxia


Is this a current diagnosis for this admission?: Yes   








(3) Severe sepsis


Is this a current diagnosis for this admission?: Yes   





(4) DIC (disseminated intravascular coagulation)


Is this a current diagnosis for this admission?: Yes   





- Plan Summary


Summary: 


Mr. Fernandez is a 75 year old man who presented on 9/04/2020 with SOB, PADILLA and 

fever. He admits to a one-week history of moderate upper respiratory symptoms 

including a nonproductive cough, intermittent fever and dyspnea with exertion 

which caused him to present to his primary care provider's office for COVID-19 

screening on 9/03/2020. He was notified on 9/04/2020 that he was positive for 

COVID-19. In the emergency room he was found to be severely hypoxemic requiring 

supplemental oxygen and eventually required noninvasive airway pressure support 

with BiPAP. He was subsequently admitted for further evaluation and treatment.





# Acute hypoxemic respiratory failure due to Covid-19 Pneumonia: still requiring

BIPAP. Overnight, on 9/07-9/08 he became much more hypoxic/tachypnic, and has 

been declining ever since. 


- check CBC with differential, CMP, CK, CRP, and ferritin daily


- check PT/INR, PTT, fibrinogen, and D-dimer every other day 


- dexamethasone 6 mg daily for 10 days or until discharge, whichever is shorter


- remdesivir: 200 mg intravenously on day 1 followed by 100 mg daily for 5 days 

total 


- convalescent plasma ordered on 9/09


- antibiotics initially discontinued given BCx x2 on admission showed no growth 

but, given concern for DIC at this time, will resume broad-spectrum antibiotics 

with cefepime and vancomycin after repeat blood cultures today





# Acute on Chronic Thrombocytopenia: records show that baseline PLT count has 

been  over the past 3 years. His respiratory status has continued to 

decline during his hospitalization and he is now on BiPAP almost continuously 

throughout the day. His PLT have continued to drop and today are only 21. No 

evidence of active bleeding and no recent medications known to cause acute 

thrombocytopenia, except heparin products. US of lower extremities on 9/8 was 

positive for non-occlusive SVT, but no DVT. He was started on argatroban and HIT

Ab was sent on 9/08, but is still pending. D-dimer has increased considerably 

over the past few days. Fibrinogen level low. Discussed with Hematology (Dr. Vera) today, who reviewed the peripheral blood smear with Dr. Brar 

(pathology). There are schistocytes and yousif cells consistent with acute 

infection/sepsis. Platelets are markedly decreased, but WBCs appear reactive and

normal in morphology, suggestive of acute reactive process due to infection and 

possible DIC. Dr. Vera believes Sepsis/DIC is more likely than HIT. However, 

given the superficial thrombosis, will continue the argatroban for now and watch

closely for evidence of bleeding.  


- Hematology consulted 


- HIT antibody pending (send out test) 


- DC Lovenox and avoid all heparin products given suspicion for HIT


- continue argatroban ggt for now





Code Status: DNR/DNI





- Time


Time Spent with patient: 35 or more minutes


Anticipated Discharge Disposition: Home, Self Care


Anticipated Discharge Timeframe: within 72 hours

## 2020-09-09 NOTE — ADVANCED CARE
- Diagnosis


(1) Acute respiratory failure with hypoxia


Diagnosis Current: Yes   





(2) COVID-19


Diagnosis Current: Yes   





(3) Severe sepsis


Diagnosis Current: Yes   





(4) DIC (disseminated intravascular coagulation)


Diagnosis Current: Yes   


Resuscitation Status: Do Not Resuscitate


Discussion: 


I spoke with both Mr. Fernandez himself in person, and his son Miguel (in [place]) via 

phone this morning. Patient/family have been updated on Mr. Fernandez's worsening 

respiratory status, increasing fatigue on BIPAP, and inability to tolerate being

off of BIPAP for even short periods of time. We also discussed our concern for 

developing DIC versus HIT at length, and my concern for the development of clots

versus bleeding at this time. 





Mr. Fernandez has repeatedly stated his wish to pursue comfort care measures 

should he continue to decline further, and his son is understanding of this, and

agrees. We discussed that if he continues to decline, we would transition to 

comfort care measures, and I am concerned that we are heading there. For now, 

will continue BIPAP, IV antibiotics, IV steroids, convalescent plasma, and all 

other measures with the hope of seeing improvement in the next 24 hours. Will 

reassess again tomorrow morning.





CODE STATUS DNR/DNI 


Medical POA: Miguel quispe 


Time Spent: >30 minutes

## 2020-09-09 NOTE — PROGRESS NOTE
Provider Note


Provider Note: 


Hematology/Oncology consultation was received.  Due to COVID-19 restrictions, 

chart was reviewed but patient was not physically examined.  Patient was 

discussed with Dr. Howell.  


VANE AGUILERA is a 75 year old male who was admitted a few days ago with 

respiratory symptoms and positive COVID-19 infection.  He was told in the past 

that he had low platelet count, and records show that he baseline PLT count has 

been  over the past 3 years.  His respiratory status has continued to 

decline during his hospitalization and he is now on BiPAP most of the time.  His

PLT have dropped and today are only 21.  No evidence of active bleeding, No 

recent medications known to cause acute thrombocytopenia, except heparin 

products.  US of lower extremities was positive for non-occlusive SVT, but no 

DVT.  He was started on argatroban and HIT Ab has been sent, but is still 

pending.  D-dimer has increased considerably over the past few days.  Await 

Fibrinogen level.  I reviewed the peripheral blood smear with Dr. Brar 

(pathology).  There are schistocytes and yousif cells consistent with acute 

infection/sepsis.  Platelets are markedly decreased, but WBCs appear reactive 

and normal in morphology.  


Although this picture is most suggestive of acute reactive process due to 

infection and possible DIC, await fibrinogen levels.  A diagnosis of HIT is 

usually ONLY made in the absence of another possible diagnosis.  I believe 

Sepsis/DIC is more likely than HIT.  However, with the superficial thrombosis, I

believe it is safer to continue the argatroban and change to Pradaxa once a bit 

more stable and PLT have increased a bit.  Watch closely for evidence of 

bleeding.  


I will continue to follow him with you and will examine him once safe to do so. 

Please feel free to call with any questions or concerns.

## 2020-09-10 VITALS — SYSTOLIC BLOOD PRESSURE: 130 MMHG | DIASTOLIC BLOOD PRESSURE: 70 MMHG

## 2020-09-10 LAB
ABSOLUTE LYMPHOCYTES# (MANUAL): 0.1 10^3/UL (ref 0.5–4.7)
ABSOLUTE MONOCYTES # (MANUAL): 0.4 10^3/UL (ref 0.1–1.4)
ADD MANUAL DIFF: YES
ALBUMIN SERPL-MCNC: 2.4 G/DL (ref 3.5–5)
ALP SERPL-CCNC: 78 U/L (ref 38–126)
ANION GAP SERPL CALC-SCNC: 5 MMOL/L (ref 5–19)
ANISOCYTOSIS BLD QL SMEAR: SLIGHT
APTT BLD: 57.9 SEC (ref 23.5–35.8)
AST SERPL-CCNC: 40 U/L (ref 17–59)
BASOPHILS NFR BLD MANUAL: 0 % (ref 0–2)
BILIRUB DIRECT SERPL-MCNC: 0.7 MG/DL (ref 0–0.4)
BILIRUB SERPL-MCNC: 2 MG/DL (ref 0.2–1.3)
BUN SERPL-MCNC: 44 MG/DL (ref 7–20)
BURR CELLS BLD QL SMEAR: (no result)
CALCIUM: 7.6 MG/DL (ref 8.4–10.2)
CHLORIDE SERPL-SCNC: 107 MMOL/L (ref 98–107)
CO2 SERPL-SCNC: 24 MMOL/L (ref 22–30)
CRP SERPL-MCNC: 161.8 MG/L (ref ?–10)
D DIMER PPP FEU-MCNC: > 20 UG/ML (ref 0–0.5)
EOSINOPHIL NFR BLD MANUAL: 0 % (ref 0–6)
ERYTHROCYTE [DISTWIDTH] IN BLOOD BY AUTOMATED COUNT: 14.3 % (ref 11.5–14)
FIBRINOGEN PPP-MCNC: 182 MG/DL (ref 209–497)
GLUCOSE SERPL-MCNC: 120 MG/DL (ref 75–110)
HCT VFR BLD CALC: 29.3 % (ref 37.9–51)
HGB BLD-MCNC: 10.7 G/DL (ref 13.5–17)
MCH RBC QN AUTO: 32.9 PG (ref 27–33.4)
MCHC RBC AUTO-ENTMCNC: 36.4 G/DL (ref 32–36)
MCV RBC AUTO: 90 FL (ref 80–97)
MONOCYTES % (MANUAL): 4 % (ref 3–13)
OVALOCYTES BLD QL SMEAR: SLIGHT
PLATELET # BLD: 32 10^3/UL (ref 150–450)
PLATELET COMMENT: (no result)
POIKILOCYTOSIS BLD QL SMEAR: SLIGHT
POTASSIUM SERPL-SCNC: 4.4 MMOL/L (ref 3.6–5)
PROT SERPL-MCNC: 5.4 G/DL (ref 6.3–8.2)
RBC # BLD AUTO: 3.25 10^6/UL (ref 4.35–5.55)
SCHISTOCYTES BLD QL SMEAR: (no result)
SEGMENTED NEUTROPHILS % (MAN): 95 % (ref 42–78)
TOTAL CELLS COUNTED BLD: 100
VARIANT LYMPHS NFR BLD MANUAL: 1 % (ref 13–45)
WBC # BLD AUTO: 10.8 10^3/UL (ref 4–10.5)

## 2020-09-10 PROCEDURE — XW13325 TRANSFUSION OF CONVALESCENT PLASMA (NONAUTOLOGOUS) INTO PERIPHERAL VEIN, PERCUTANEOUS APPROACH, NEW TECHNOLOGY GROUP 5: ICD-10-PCS | Performed by: HOSPITALIST

## 2020-09-10 RX ADMIN — FAMOTIDINE SCH: 10 INJECTION INTRAVENOUS at 11:22

## 2020-09-10 RX ADMIN — CEFEPIME SCH MLS/HR: 2 INJECTION, POWDER, FOR SOLUTION INTRAMUSCULAR; INTRAVENOUS at 00:34

## 2020-09-10 RX ADMIN — LORAZEPAM PRN MG: 2 INJECTION INTRAMUSCULAR; INTRAVENOUS at 12:13

## 2020-09-10 RX ADMIN — DEXAMETHASONE SODIUM PHOSPHATE SCH MG: 4 INJECTION, SOLUTION INTRAMUSCULAR; INTRAVENOUS at 05:16

## 2020-09-10 RX ADMIN — Medication SCH: at 00:41

## 2020-09-10 RX ADMIN — LORAZEPAM PRN MG: 2 INJECTION INTRAMUSCULAR; INTRAVENOUS at 10:39

## 2020-09-10 RX ADMIN — Medication SCH: at 07:24

## 2020-09-10 NOTE — DEATH SUMMARY
Death Summary


Date : 09/10/20


Time of Death:: 13:00


Autopsy: No


Resuscitation Status: Comfort Measures Only





- Final Diagnosis


(1) Acute respiratory failure with hypoxia


Is this a current diagnosis for this admission?: Yes   








(3) Severe sepsis


Is this a current diagnosis for this admission?: Yes   





(4) DIC (disseminated intravascular coagulation)


Is this a current diagnosis for this admission?: Yes   


Hospital Course:: 


Mr. Fernandez was a 75 year old gentleman who presented on 2020 with SOB, PADILLA

and fever. He had a COVID-19 screening on 2020. He was notified on 

2020 that he was positive for COVID-19. In the emergency room he was found 

to be severely hypoxemic requiring supplemental oxygen and eventually required 

noninvasive airway pressure support with BiPAP. He was subsequently admitted for

acute hypoxemic respiratory failure due to Covid-19 Pneumonia. Unfortunately, 

despite maximal medical treatment, including steroids, remdesivir, and 

convalescent plasma, he continued to require BIPAP with increasing oxygen levels

throughout hospitalization. Hospital course was further complicated by 

development of DIC in the s/o sepsis due to COVID-19 Pneumonia. Mr. Fernandez had 

requested to be DNR/DNI from the beginning, and requested to transition to 

comfort care measures eventually. His family was understanding and agreeable. He

was transitioned to comfort care measures on the morning of 9/10/2020, and was 

pronounced dead at 1300 on the same day.

## 2020-09-10 NOTE — ADVANCED CARE
- Diagnosis


(1) Acute respiratory failure with hypoxia


Diagnosis Current: Yes   





(2) COVID-19


Diagnosis Current: Yes   





(3) Severe sepsis


Diagnosis Current: Yes   





(4) DIC (disseminated intravascular coagulation)


Diagnosis Current: Yes   


Resuscitation Status: Comfort Measures Only


Discussion: 


I spoke with both Mr. Fernandez himself in person, and his son Miguel (in [place]) via 

phone again today. Patient/family have been updated on Mr. Fernandez's worsening 

respiratory status, increasing fatigue on BIPAP at 100% FIO2, and inability to 

tolerate being off of BIPAP for even short periods of time. Mr. Fernandez is 

altered today, very sleepy, unable to really carry a conversation at this point.







Per the wishes of Mr. Fernandez as previously stated, and now his only child, Miguel Cook, we will change goals to COMFORT CARE MEASURES ONLY.





We will DC BIPAP, start morphine PRN SOB/breathlessness, Ativan PRN 

anxiety/agitation. 


Time Spent: >30 minutes